# Patient Record
Sex: MALE | Race: ASIAN | NOT HISPANIC OR LATINO
[De-identification: names, ages, dates, MRNs, and addresses within clinical notes are randomized per-mention and may not be internally consistent; named-entity substitution may affect disease eponyms.]

---

## 2017-01-13 ENCOUNTER — APPOINTMENT (OUTPATIENT)
Dept: CARDIOLOGY | Facility: CLINIC | Age: 72
End: 2017-01-13

## 2017-01-26 VITALS
RESPIRATION RATE: 16 BRPM | OXYGEN SATURATION: 99 % | SYSTOLIC BLOOD PRESSURE: 128 MMHG | TEMPERATURE: 98 F | DIASTOLIC BLOOD PRESSURE: 65 MMHG | HEART RATE: 61 BPM | HEIGHT: 66 IN | WEIGHT: 169.98 LBS

## 2017-01-26 RX ORDER — CHLORHEXIDINE GLUCONATE 213 G/1000ML
1 SOLUTION TOPICAL ONCE
Qty: 0 | Refills: 0 | Status: DISCONTINUED | OUTPATIENT
Start: 2017-02-10 | End: 2017-02-10

## 2017-01-26 NOTE — H&P ADULT. - CARDIOVASCULAR SYMPTOMS
paroxysmal nocturnal dyspnea/orthopnea/chest pain/dyspnea on exertion/palpitations dyspnea on exertion/chest pain

## 2017-01-26 NOTE — H&P ADULT. - HISTORY OF PRESENT ILLNESS
*******SKELETON:    ******HISTORY OBTAINED FROM PATIENT VIA PACIFIC Malay SPEAKING INTERPRETOR ID #       71 y.o Hebrew Speaking male with PMHx of HTN, hyperlipidemia, NIDDM, PPM (last interogate.......   ), Known CAD with prior PCIs, most recently IVUS guided KAYKAY proximal LAD @ [facility] on 06/15/16,     who presented to his cardiologist c/o       He denies experiencing any CP, SOB, palpitations, dizziness, syncope, or decrease in exercise tolerance.  Pt subsequently underwent a       In light of pt's risk factors, Known CAD, and above CCS Anginal Class 3 Symptoms, and abnormal Stress test, pt is now referred to [facility] for recommended Cardiac Cath with possible intervention if clinically indicated to r/o suspected CAD prorgre        MOST RECENT CATH HX:    Cardiac Cath @ [facility] 4/2016: normal LM, normal LAD, 40% pLCx, 60% OM2, normal RCA, EF 55%.  Recommended for medical therapy.      Cardiac Cath @ [facility] on 06/15/16: 2VCAD; Normal LM, 70% proximal LAD with OCT MLA = 2.59mm2, patent prior distal LCx stent, 70% OM1, RCA with luminal irregularities, LVEF of 65%.  NO AS or MR.  Successful IVUS guided KAYKAY proximal LAD. *******SKELETON:    ******HISTORY OBTAINED FROM PATIENT VIA PACIFIC Danish SPEAKING INTERPRETOR ID #       71 y.o Croatian Speaking male with PMHx of HTN, hyperlipidemia, NIDDM, PPM (last interogate.......   ), Known CAD with prior PCIs, most recently IVUS guided KAYKAY proximal LAD @ [facility] on 06/15/16 with residual 70% OM1 lesion, who presented to his cardiologist c/o       He denies experiencing any CP, SOB, palpitations, dizziness, syncope, or decrease in exercise tolerance.  Pt subsequently underwent a       In light of pt's risk factors, Known CAD, and above CCS Anginal Class 3 Symptoms, and abnormal Stress test, pt is now referred to [facility] for recommended Cardiac Cath with possible intervention if clinically indicated to r/o suspected CAD prorgre        MOST RECENT CATH HX:    Cardiac Cath @ [facility] 4/2016: normal LM, normal LAD, 40% pLCx, 60% OM2, normal RCA, EF 55%.  Recommended for medical therapy.      Cardiac Cath @ [facility] on 06/15/16: 2VCAD; Normal LM, 70% proximal LAD with OCT MLA = 2.59mm2, patent prior distal LCx stent, 70% OM1, RCA with luminal irregularities, LVEF of 65%.  NO AS or MR.  Successful IVUS guided KAYKAY proximal LAD. *******SKELETON:    ******HISTORY OBTAINED FROM PATIENT VIA PACIFIC Bulgarian SPEAKING INTERPRETOR ID #       ***** Previous Admission, post procedure pt with pt inability to void and bladder scan revealed 800cc urine and garcia was inserted. Garcia was removed overnight and pt passed  voiding trial with no issues.        71 y.o Bulgarian Speaking male with PMHx of HTN, hyperlipidemia, NIDDM, PPM (last interogated.......   ), Known CAD with prior PCIs, most recently IVUS guided KAYKAY proximal LAD @ [facility] on 06/15/16 with residual 70% OM1 lesion, who presented to his cardiologist c/o       He denies experiencing any CP, SOB, palpitations, dizziness, syncope, or decrease in exercise tolerance.  Pt subsequently underwent a       In light of pt's risk factors, Known CAD, and above CCS Anginal Class 3 Symptoms, and abnormal Stress test, pt is now referred to [facility] for recommended Cardiac Cath with possible intervention if clinically indicated to r/o suspected CAD progression.          MOST RECENT CATH HX:    Cardiac Cath @ [facility] 4/2016: normal LM, normal LAD, 40% pLCx, 60% OM2, normal RCA, EF 55%.  Recommended for medical therapy.      Cardiac Cath @ [facility] on 06/15/16: 2VCAD; Normal LM, 70% proximal LAD with OCT MLA = 2.59mm2, patent prior distal LCx stent, 70% OM1, RCA with luminal irregularities, LVEF of 65%.  NO AS or MR.  Successful IVUS guided KAYKAY proximal LAD. ******HISTORY OBTAINED FROM PATIENT VIA [facility] SPEAKING INTERPRETOR ID # [id number]      ***** OF NOTE: Previous Admission, post procedure pt with inability to void.  Bladder scan done revealed 800cc urine and garcia was inserted. Garcia was removed overnight and pt passed  voiding trial with no issues.        71 y.o Setswana Speaking male with PMHx of HTN, hyperlipidemia, NIDDM, ICD (last interrogated   ), Known CAD with prior PCIs, most recently IVUS guided KAYKAY proximal LAD @ [facility] on 06/15/16 with residual 70% OM1 lesion, post procedure with difficulty urinating, garcia catheter was placed, removed, and who presented to his cardiologist c/o left sided CP described as "pressure"  radiating through to the back  with accompanying dyspnea when walking on incline for the past 2-3 months.  Symptoms last a "few' seconds and resolve on their own.  He denies experiencing any CP, SOB, palpitations, dizziness, syncope, or decrease in exercise tolerance.  Pt subsequently underwent a       In light of pt's risk factors, Known CAD, and above CCS Anginal Class 3 Symptoms, and abnormal Stress test, pt is now referred to [facility] for recommended Cardiac Cath with possible intervention if clinically indicated to r/o suspected CAD progression.          MOST RECENT CATH HX:    Cardiac Cath @ [facility] 4/2016: normal LM, normal LAD, 40% pLCx, 60% OM2, normal RCA, EF 55%.  Recommended for medical therapy.      Cardiac Cath @ [facility] on 06/15/16: 2VCAD; Normal LM, 70% proximal LAD with OCT MLA = 2.59mm2, patent prior distal LCx stent, 70% OM1, RCA with luminal irregularities, LVEF of 65%.  NO AS or MR.  Successful IVUS guided KAYKAY proximal LAD. ******HISTORY OBTAINED FROM PATIENT VIA [employer] SPEAKING INTERPRETOR ID # [id number]      ***PT TO BRING IN ALL MEDS     71 y.o Luxembourgish Speaking male with PMHx of HTN, hyperlipidemia, NIDDM, ICD (last interrogated   ), Known CAD with prior PCIs, most recently IVUS guided KAYKAY proximal LAD @ [facility] on 06/15/16 with residual 70% OM1 lesion, post procedure with difficulty urinating requiring garcia, which was removed overnight and pt passed voiding trial with no difficulty, who presented returned to his cardiologist, Dr. Ashby,  c/o left sided CP described as "pressure"  radiating through to the back  with accompanying dyspnea when walking on incline for the past 2-3 months.  Symptoms last a "few' seconds and resolve on their own.  He denies experiencing any CP, SOB, palpitations, dizziness, syncope, or decrease in exercise tolerance.  Since his last procedure with us he has had no problems urinating. Nuclear Stress test done on   revealed       In light of pt's risk factors, Known CAD, above presenting CCS Anginal Class 3 Symptoms, and abnormal Stress test, pt is now referred to [facility] for recommended Cardiac Cath with possible intervention if clinically indicated to r/o suspected CAD progression.          MOST RECENT CATH HX:    Cardiac Cath @ [facility] 4/2016: normal LM, normal LAD, 40% pLCx, 60% OM2, normal RCA, EF 55%.  Recommended for medical therapy.      Cardiac Cath @ [facility] on 06/15/16: 2VCAD; Normal LM, 70% proximal LAD with OCT MLA = 2.59mm2, patent prior distal LCx stent, 70% OM1, RCA with luminal irregularities, LVEF of 65%.  NO AS or MR.  Successful IVUS guided KAYKAY proximal LAD. ******HISTORY OBTAINED FROM PREVIOUS ADMISSION, MD NOTE,  AND PATIENT VIA PACIFIC Armenian SPEAKING INTERPRETOR ID # [id number]      ***PT TO BRING IN ALL MEDS     71 y.o Bulgarian Speaking male with PMHx of HTN, hyperlipidemia, NIDDM, PPM (? when last checked, Per MD note needs to be interrogated),  Known CAD with prior PCIs, most recently IVUS guided KAYKAY proximal LAD @ [facility] on 06/15/16 with residual 70% OM1 lesion, post procedure with difficulty urinating requiring garcia, which was removed overnight and pt passed voiding trial with no difficulty, who presented returned to his cardiologist, Dr. Ashby,  c/o left sided CP described as "pressure"  radiating through to the back  with accompanying dyspnea when walking on incline for the past 2-3 months.  Symptoms last a "few' seconds and resolve on their own.  He denies experiencing any palpitations, n/v, diaphoresis, dizziness, or syncope.  Since his last procedure with us he reports no problems urinating. Nuclear Stress test done on 01/13/17 revealed small, mild to moderate defect in the inferior wall that is fixed, suggestive of diaphragmatic attenuation artifact, LVEF of 70%.      In light of pt's risk factors, Known CAD, above presenting CCS Anginal Class 3 Symptoms, and suboptimal Stress test, pt is now referred to [facility] for recommended Cardiac Cath with possible intervention if clinically indicated to r/o suspected CAD progression.          MOST RECENT CATH HX:    Cardiac Cath @ [facility] 4/2016: normal LM, normal LAD, 40% pLCx, 60% OM2, normal RCA, EF 55%.  Recommended for medical therapy.      Cardiac Cath @ [facility] on 06/15/16: 2VCAD; Normal LM, 70% proximal LAD with OCT MLA = 2.59mm2, patent prior distal LCx stent, 70% OM1, RCA with luminal irregularities, LVEF of 65%.  NO AS or MR.  Successful IVUS guided KAYKAY proximal LAD. ******HISTORY OBTAINED FROM PREVIOUS ADMISSION, MD NOTE,  AND PATIENT VIA PACIFIC French SPEAKING INTERPRETOR ID # [id number]      ***PT TO BRING IN ALL MEDS     71 y.o Kyrgyz Speaking male with PMHx of HTN, hyperlipidemia, NIDDM, PPM (? when last checked, Per MD note needs to be interrogated),  Known CAD with prior PCIs, most recently IVUS guided KAYKAY proximal LAD @ [facility] on 06/15/16 with residual 70% OM1 lesion, post procedure with difficulty urinating requiring garcia, which was removed overnight and pt passed voiding trial with no difficulty, who presented returned to his cardiologist, Dr. Ashby,  c/o left sided CP described as "pressure"  radiating through to the back  with accompanying dyspnea when walking on incline for the past 2-3 months.  Symptoms last a "few' seconds and resolve on their own.  He denies experiencing any palpitations, n/v, diaphoresis, dizziness, or syncope.  Since his last procedure with us he reports no problems urinating. Nuclear Stress test done on 01/13/17 revealed small, mild to moderate defect in the inferior wall that is fixed, suggestive of diaphragmatic attenuation artifact, LVEF of 70%.      In light of pt's risk factors, Known CAD, above presenting CCS Anginal Class 3 Symptoms in the setting of a suboptimal Stress test, pt is now referred to [facility] for recommended Cardiac Cath with possible intervention if clinically indicated to r/o suspected CAD progression.          MOST RECENT CATH HX:    Cardiac Cath @ [facility] 4/2016: normal LM, normal LAD, 40% pLCx, 60% OM2, normal RCA, EF 55%.  Recommended for medical therapy.      Cardiac Cath @ [facility] on 06/15/16: 2VCAD; Normal LM, 70% proximal LAD with OCT MLA = 2.59mm2, patent prior distal LCx stent, 70% OM1, RCA with luminal irregularities, LVEF of 65%.  NO AS or MR.  Successful IVUS guided KAYKAY proximal LAD. ******HISTORY OBTAINED FROM PREVIOUS ADMISSION, MD NOTE,  DR. IBARRA, AND PATIENT VIA PACIFIC Sinhala SPEAKING INTERPRETOR ID # [id number]      ***PT TO BRING IN ALL MEDS     71 y.o Turkish Speaking male, POOR HISTORIAN, with PMHx of HTN, hyperlipidemia, NIDDM, PPM for Symptomatic Bradycardia (Last interrogated ~ 2 months ago @ OSH as per Dr. Ibarra), Known CAD with prior PCIs, most recently IVUS guided KAYKAY proximal LAD @ [facility] on 06/15/16 with residual 70% OM1 lesion, post procedure pt with difficulty urinating requiring garcia, which was removed overnight and pt passed voiding trial with no difficulty.  Since his last procedure with us he reports no problems urinating.  He recently returned to his cardiologist, Dr. Ibarra,  c/o left sided CP described as "pressure"  radiating through to the back  with accompanying dyspnea when walking on incline for the past 2-3 months.  Symptoms last a "few' seconds and resolve on their own.  He denies experiencing any palpitations, n/v, diaphoresis, dizziness, or syncope.  Nuclear Stress test done on 01/13/17 revealed small, mild to moderate defect in the inferior wall that is fixed, suggestive of diaphragmatic attenuation artifact, LVEF of 70%.      In light of pt's risk factors, Known CAD, above presenting CCS Anginal Class 3 Symptoms in the setting of a suboptimal Stress test, pt is now referred to [facility] for recommended Cardiac Cath with possible intervention if clinically indicated to r/o suspected CAD progression.          MOST RECENT CATH HX:    Cardiac Cath @ [facility] 4/2016: normal LM, normal LAD, 40% pLCx, 60% OM2, normal RCA, EF 55%.  Recommended for medical therapy.      Cardiac Cath @ [facility] on 06/15/16: 2VCAD; Normal LM, 70% proximal LAD with OCT MLA = 2.59mm2, patent prior distal LCx stent, 70% OM1, RCA with luminal irregularities, LVEF of 65%.  NO AS or MR.  Successful IVUS guided KAYKAY proximal LAD. ******HISTORY OBTAINED FROM PREVIOUS ADMISSION, MD NOTE,  DR. IBARRA, AND PATIENT VIA PACIFIC Persian SPEAKING INTERPRETOR ID # [id number]      71 y.o Swedish Speaking male, POOR HISTORIAN, with PMHx of HTN, hyperlipidemia, NIDDM, PPM for Symptomatic Bradycardia (Last interrogated ~ 2 months ago @ OSH as per Dr. Ibarra), Known CAD with prior PCIs, most recently IVUS guided KAYKAY proximal LAD @ [facility] on 06/15/16 with residual 70% OM1 lesion, post procedure pt with difficulty urinating requiring garcia, which was removed overnight and pt passed voiding trial with no difficulty.  Since his last procedure with us he reports no problems urinating.  He recently returned to his cardiologist, Dr. Ibarra,  c/o left sided CP described as "pressure"  radiating through to the back  with accompanying dyspnea when walking on incline for the past 2-3 months.  Symptoms last a "few' seconds and resolve on their own.  He denies experiencing any palpitations, n/v, diaphoresis, dizziness, or syncope.  Nuclear Stress test done on 01/13/17 revealed small, mild to moderate defect in the inferior wall that is fixed, suggestive of diaphragmatic attenuation artifact, LVEF of 70%.      In light of pt's risk factors, Known CAD, above presenting CCS Anginal Class 3 Symptoms in the setting of a suboptimal Stress test, pt is now referred to [facility] for recommended Cardiac Cath with possible intervention if clinically indicated to r/o suspected CAD progression.          MOST RECENT CATH HX:    Cardiac Cath @ [facility] 4/2016: normal LM, normal LAD, 40% pLCx, 60% OM2, normal RCA, EF 55%.  Recommended for medical therapy.      Cardiac Cath @ [facility] on 06/15/16: 2VCAD; Normal LM, 70% proximal LAD with OCT MLA = 2.59mm2, patent prior distal LCx stent, 70% OM1, RCA with luminal irregularities, LVEF of 65%.  NO AS or MR.  Successful IVUS guided KAYKAY proximal LAD.

## 2017-01-26 NOTE — H&P ADULT. - PMH
Arrhythmia    DM (diabetes mellitus)    HLD (hyperlipidemia)    HTN (hypertension)    Pacemaker    Syncope

## 2017-01-26 NOTE — H&P ADULT. - ASSESSMENT
71 y.o Chinese Speaking male, POOR HISTORIAN, with PMHx of HTN, hyperlipidemia, NIDDM, PPM for Symptomatic Bradycardia (Last interrogated ~ 2 months ago @ OSH as per Dr. Ashby), Known CAD with prior PCIs, most recently IVUS guided KAYKAY proximal LAD @ [facility] on 06/15/16 with residual 70% OM1 lesion, who presents for recommended Cardiac cath with possible intervention if clinically indicated secondary to CCS Anginal Class 3 Symptoms in the setting of an abnormal stress test.      OF NOTE: pt with WBC 17 and Plt 407   As d/w Dr. Miller,   Pt 71 y.o Sinhala Speaking male, POOR HISTORIAN, with PMHx of HTN, hyperlipidemia, NIDDM, PPM for Symptomatic Bradycardia (Last interrogated ~ 2 months ago @ OSH as per Dr. Ashby), Known CAD with prior PCIs, most recently IVUS guided KAYKAY proximal LAD @ [facility] on 06/15/16 with residual 70% OM1 lesion, who presents for recommended Cardiac cath with possible intervention if clinically indicated secondary to CCS Anginal Class 3 Symptoms in the setting of an abnormal stress test.      OF NOTE:  Pt with  Leukocytosis of 15.7, afebrile.   No recent infections or steroid use. 71 y.o Wolof Speaking male, POOR HISTORIAN, with PMHx of HTN, hyperlipidemia, NIDDM, PPM for Symptomatic Bradycardia (Last interrogated ~ 2 months ago @ OSH as per Dr. Ashby), Known CAD with prior PCIs, most recently IVUS guided KAYKAY proximal LAD @ [facility] on 06/15/16 with residual 70% OM1 lesion, who presents for recommended Cardiac cath with possible intervention if clinically indicated secondary to CCS Anginal Class 3 Symptoms in the setting of an abnormal stress test.      OF NOTE:  Pt with  Leukocytosis of 15.7, afebrile.   No recent infections or steroid use.  As d/w Dr. Miller will proceed with procedure.

## 2017-02-01 NOTE — H&P ADULT. - SKIN
February 9, 2017    Lázaro Wang  [address] [place] [address]             [facility]  [address]  [place] [facility] [address]  Phone: [phone] Dear Mr. Wang:    We have tried to reach you by mychart unsuccessfully.        [facility] is committed to your overall health.  To help you get the most out of each of your visits, we will review your information to make sure you are up to date on all of your recommended tests and/or procedures.       Dr. Trotter has found that you may be due for annual dilated eye exam, diabetic foot exam.     If you have had any of the above done at another facility, please bring the records or information with you so that your record at [facility] will be complete.     If you are currently taking medication, please bring it with you to your appointment for review.     Also, if you have any type of Advanced Directives, please bring them with you to your office visit so we may scan them into your chart.     Juanjo Hemphill LPN Clinical Care Coordinator   [facility] [address] [facility] [address]   [place] [address]   [phone] (p)   [phone] (f)      No lesions; no rash

## 2017-02-10 ENCOUNTER — OUTPATIENT (OUTPATIENT)
Dept: OUTPATIENT SERVICES | Facility: HOSPITAL | Age: 72
LOS: 1 days | Discharge: NURSING, REHAB OR LONG-TERM CARE | End: 2017-02-10
Payer: MEDICARE

## 2017-02-10 DIAGNOSIS — Z95.0 PRESENCE OF CARDIAC PACEMAKER: Chronic | ICD-10-CM

## 2017-02-10 LAB
ALBUMIN SERPL ELPH-MCNC: 4.2 G/DL — SIGNIFICANT CHANGE UP (ref 3.4–5)
ALP SERPL-CCNC: 66 U/L — SIGNIFICANT CHANGE UP (ref 40–120)
ALT FLD-CCNC: 54 U/L — HIGH (ref 12–42)
ANION GAP SERPL CALC-SCNC: 8 MMOL/L — LOW (ref 9–16)
APTT BLD: 32 SEC — SIGNIFICANT CHANGE UP (ref 27.5–37.4)
AST SERPL-CCNC: 27 U/L — SIGNIFICANT CHANGE UP (ref 15–37)
BASOPHILS NFR BLD AUTO: 2 % — SIGNIFICANT CHANGE UP (ref 0–2)
BILIRUB SERPL-MCNC: 0.4 MG/DL — SIGNIFICANT CHANGE UP (ref 0.2–1.2)
BUN SERPL-MCNC: 16 MG/DL — SIGNIFICANT CHANGE UP (ref 7–23)
CALCIUM SERPL-MCNC: 8.6 MG/DL — SIGNIFICANT CHANGE UP (ref 8.5–10.5)
CHLORIDE SERPL-SCNC: 104 MMOL/L — SIGNIFICANT CHANGE UP (ref 96–108)
CHOLEST SERPL-MCNC: 102 MG/DL — SIGNIFICANT CHANGE UP
CK MB CFR SERPL CALC: 1.4 NG/ML — SIGNIFICANT CHANGE UP (ref 0.5–3.6)
CO2 SERPL-SCNC: 28 MMOL/L — SIGNIFICANT CHANGE UP (ref 22–31)
CREAT SERPL-MCNC: 1.13 MG/DL — SIGNIFICANT CHANGE UP (ref 0.5–1.3)
CRP SERPL-MCNC: <0.29 MG/DL — SIGNIFICANT CHANGE UP
GLUCOSE SERPL-MCNC: 178 MG/DL — HIGH (ref 70–99)
HBA1C BLD-MCNC: 7.3 % — HIGH (ref 4.8–5.6)
HCT VFR BLD CALC: 43.1 % — SIGNIFICANT CHANGE UP (ref 39–50)
HDLC SERPL-MCNC: 43 MG/DL — SIGNIFICANT CHANGE UP
HGB BLD-MCNC: 14.5 G/DL — SIGNIFICANT CHANGE UP (ref 13–17)
INR BLD: 0.92 — SIGNIFICANT CHANGE UP (ref 0.88–1.16)
LIPID PNL WITH DIRECT LDL SERPL: 23 MG/DL — SIGNIFICANT CHANGE UP
LYMPHOCYTES # BLD AUTO: 20 % — SIGNIFICANT CHANGE UP (ref 13–44)
MCHC RBC-ENTMCNC: 28.5 PG — SIGNIFICANT CHANGE UP (ref 27–34)
MCHC RBC-ENTMCNC: 33.6 G/DL — SIGNIFICANT CHANGE UP (ref 32–36)
MCV RBC AUTO: 84.7 FL — SIGNIFICANT CHANGE UP (ref 80–100)
MONOCYTES NFR BLD AUTO: 5 % — SIGNIFICANT CHANGE UP (ref 2–14)
NEUTROPHILS NFR BLD AUTO: 68 % — SIGNIFICANT CHANGE UP (ref 43–77)
PLATELET # BLD AUTO: 407 K/UL — HIGH (ref 150–400)
POTASSIUM SERPL-MCNC: 3.8 MMOL/L — SIGNIFICANT CHANGE UP (ref 3.5–5.3)
POTASSIUM SERPL-SCNC: 3.8 MMOL/L — SIGNIFICANT CHANGE UP (ref 3.5–5.3)
PROT SERPL-MCNC: 8 G/DL — SIGNIFICANT CHANGE UP (ref 6.4–8.2)
PROTHROM AB SERPL-ACNC: 10.2 SEC — SIGNIFICANT CHANGE UP (ref 10–13.1)
RBC # BLD: 5.09 M/UL — SIGNIFICANT CHANGE UP (ref 4.2–5.8)
RBC # FLD: 14.4 % — SIGNIFICANT CHANGE UP (ref 10.3–16.9)
SODIUM SERPL-SCNC: 140 MMOL/L — SIGNIFICANT CHANGE UP (ref 135–145)
TOTAL CHOLESTEROL/HDL RATIO MEASUREMENT: 2.4 RATIO — SIGNIFICANT CHANGE UP
TRIGL SERPL-MCNC: 182 MG/DL — HIGH
WBC # BLD: 17 K/UL — HIGH (ref 3.8–10.5)
WBC # FLD AUTO: 17 K/UL — HIGH (ref 3.8–10.5)

## 2017-02-10 PROCEDURE — 85025 COMPLETE CBC W/AUTO DIFF WBC: CPT

## 2017-02-10 PROCEDURE — 86140 C-REACTIVE PROTEIN: CPT

## 2017-02-10 PROCEDURE — 36415 COLL VENOUS BLD VENIPUNCTURE: CPT

## 2017-02-10 PROCEDURE — 85730 THROMBOPLASTIN TIME PARTIAL: CPT

## 2017-02-10 PROCEDURE — 80061 LIPID PANEL: CPT

## 2017-02-10 PROCEDURE — 80053 COMPREHEN METABOLIC PANEL: CPT

## 2017-02-10 PROCEDURE — 93458 L HRT ARTERY/VENTRICLE ANGIO: CPT | Mod: 26

## 2017-02-10 PROCEDURE — 85610 PROTHROMBIN TIME: CPT

## 2017-02-10 PROCEDURE — 82550 ASSAY OF CK (CPK): CPT

## 2017-02-10 PROCEDURE — C1769: CPT

## 2017-02-10 PROCEDURE — C1887: CPT

## 2017-02-10 PROCEDURE — 93458 L HRT ARTERY/VENTRICLE ANGIO: CPT

## 2017-02-10 PROCEDURE — 82553 CREATINE MB FRACTION: CPT

## 2017-02-10 PROCEDURE — 83036 HEMOGLOBIN GLYCOSYLATED A1C: CPT

## 2017-02-10 RX ORDER — SODIUM CHLORIDE 9 MG/ML
1000 INJECTION INTRAMUSCULAR; INTRAVENOUS; SUBCUTANEOUS
Qty: 0 | Refills: 0 | Status: DISCONTINUED | OUTPATIENT
Start: 2017-02-10 | End: 2017-02-10

## 2017-02-10 RX ORDER — ASPIRIN/CALCIUM CARB/MAGNESIUM 324 MG
81 TABLET ORAL ONCE
Qty: 0 | Refills: 0 | Status: COMPLETED | OUTPATIENT
Start: 2017-02-10 | End: 2017-02-10

## 2017-02-10 RX ORDER — SODIUM CHLORIDE 9 MG/ML
500 INJECTION INTRAMUSCULAR; INTRAVENOUS; SUBCUTANEOUS
Qty: 0 | Refills: 0 | Status: DISCONTINUED | OUTPATIENT
Start: 2017-02-10 | End: 2017-02-10

## 2017-02-10 RX ADMIN — SODIUM CHLORIDE 75 MILLILITER(S): 9 INJECTION INTRAMUSCULAR; INTRAVENOUS; SUBCUTANEOUS at 13:52

## 2017-02-10 RX ADMIN — Medication 81 MILLIGRAM(S): at 13:51

## 2017-02-10 NOTE — PROGRESS NOTE ADULT - SUBJECTIVE AND OBJECTIVE BOX
Interventional Cardiology PA SDA Discharge Note    Patient without complaints. Ambulated and voided without difficulties    Afebrile, VSS    Ext:    		Right/Left             Groin:       hematoma,     bruit, dressing; C/D/I  		Right/Left        R      Radial :  no  hematoma,  no   bleeding, dressing; C/D/I      Pulses:    intact RAD/DP/PT?to baseline     A/P:  HPI:  ******HISTORY OBTAINED FROM PREVIOUS ADMISSION, MD NOTE,  DR. IBARRA, AND PATIENT VIA PACIFIC [employer] SPEAKING INTERPRETOR ID # [id number]    71 y.o Czech Speaking male, POOR HISTORIAN, with PMHx of HTN, hyperlipidemia, NIDDM, PPM for Symptomatic Bradycardia (Last interrogated ~ 2 months ago @ OSH as per Dr. Ibarra), Known CAD with prior PCIs, most recently IVUS guided KAYKAY proximal LAD @ [facility] on 06/15/16 with residual 70% OM1 lesion, post procedure pt with difficulty urinating requiring garcia, which was removed overnight and pt passed voiding trial with no difficulty.  Since his last procedure with us he reports no problems urinating.  He recently returned to his cardiologist, Dr. Ibarra,  c/o left sided CP described as "pressure"  radiating through to the back  with accompanying dyspnea when walking on incline for the past 2-3 months.  Symptoms last a "few' seconds and resolve on their own.  He denies experiencing any palpitations, n/v, diaphoresis, dizziness, or syncope.  Nuclear Stress test done on 01/13/17 revealed small, mild to moderate defect in the inferior wall that is fixed, suggestive of diaphragmatic attenuation artifact, LVEF of 70%.      In light of pt's risk factors, Known CAD, above presenting CCS Anginal Class 3 Symptoms in the setting of a suboptimal Stress test, pt is now referred to [facility] for recommended Cardiac Cath with possible intervention if clinically indicated to r/o suspected CAD progression.    s/p cath 2/10 distal LCx stent widely patent, 50% prox OM2,, pLAD stent patent, LVEF 55% EDP 19, nonob CAD, continue medical management        MOST RECENT CATH HX:    Cardiac Cath @ [facility] 4/2016: normal LM, normal LAD, 40% pLCx, 60% OM2, normal RCA, EF 55%.  Recommended for medical therapy.      Cardiac Cath @ [facility] on 06/15/16: 2VCAD; Normal LM, 70% proximal LAD with OCT MLA = 2.59mm2, patent prior distal LCx stent, 70% OM1, RCA with luminal irregularities, LVEF of 65%.  NO AS or MR.  Successful IVUS guided KAYKAY proximal LAD. (26 Jan 2017 07:53)          1.	Stable for discharge as per attending  after bed rest, pt voids, groin/wrist stable and 30 minutes of ambulation.  2.	Follow-up with PMD/Cardiologist Isma in 1-2 weeks  3.	Discharged forms signed and copies in chart

## 2017-02-17 DIAGNOSIS — E78.5 HYPERLIPIDEMIA, UNSPECIFIED: ICD-10-CM

## 2017-02-17 DIAGNOSIS — I20.8 OTHER FORMS OF ANGINA PECTORIS: ICD-10-CM

## 2017-02-17 DIAGNOSIS — E11.9 TYPE 2 DIABETES MELLITUS WITHOUT COMPLICATIONS: ICD-10-CM

## 2017-02-17 DIAGNOSIS — Z82.49 FAMILY HISTORY OF ISCHEMIC HEART DISEASE AND OTHER DISEASES OF THE CIRCULATORY SYSTEM: ICD-10-CM

## 2017-02-17 DIAGNOSIS — I10 ESSENTIAL (PRIMARY) HYPERTENSION: ICD-10-CM

## 2017-04-21 ENCOUNTER — EMERGENCY (EMERGENCY)
Facility: HOSPITAL | Age: 72
LOS: 1 days | Discharge: HOME | End: 2017-04-21
Attending: EMERGENCY MEDICINE | Admitting: EMERGENCY MEDICINE
Payer: MEDICARE

## 2017-04-21 VITALS
TEMPERATURE: 99 F | OXYGEN SATURATION: 96 % | HEART RATE: 63 BPM | SYSTOLIC BLOOD PRESSURE: 139 MMHG | RESPIRATION RATE: 18 BRPM | DIASTOLIC BLOOD PRESSURE: 75 MMHG

## 2017-04-21 DIAGNOSIS — Z95.0 PRESENCE OF CARDIAC PACEMAKER: Chronic | ICD-10-CM

## 2017-04-21 LAB
BASOPHILS # BLD AUTO: 0.4 K/UL — HIGH (ref 0–0.2)
BASOPHILS NFR BLD AUTO: 2 % — SIGNIFICANT CHANGE UP (ref 0–2)
EOSINOPHIL # BLD AUTO: 0.2 K/UL — SIGNIFICANT CHANGE UP (ref 0–0.5)
EOSINOPHIL NFR BLD AUTO: 1 % — SIGNIFICANT CHANGE UP (ref 0–6)
HCT VFR BLD CALC: 39.2 % — SIGNIFICANT CHANGE UP (ref 39–50)
HGB BLD-MCNC: 12.8 G/DL — LOW (ref 13–17)
LYMPHOCYTES # BLD AUTO: 2 K/UL — SIGNIFICANT CHANGE UP (ref 1–3.3)
LYMPHOCYTES # BLD AUTO: 8 % — LOW (ref 13–44)
MCHC RBC-ENTMCNC: 28.4 PG — SIGNIFICANT CHANGE UP (ref 27–34)
MCHC RBC-ENTMCNC: 32.8 GM/DL — SIGNIFICANT CHANGE UP (ref 32–36)
MCV RBC AUTO: 86.6 FL — SIGNIFICANT CHANGE UP (ref 80–100)
METAMYELOCYTES # FLD: 3 % — HIGH (ref 0–0)
MONOCYTES # BLD AUTO: 0.9 K/UL — SIGNIFICANT CHANGE UP (ref 0–0.9)
MONOCYTES NFR BLD AUTO: 7 % — SIGNIFICANT CHANGE UP (ref 2–14)
MYELOCYTES NFR BLD: 2 % — HIGH (ref 0–0)
NEUTROPHILS # BLD AUTO: SIGNIFICANT CHANGE UP (ref 1.8–7.4)
NEUTROPHILS NFR BLD AUTO: 77 % — SIGNIFICANT CHANGE UP (ref 43–77)
PLAT MORPH BLD: NORMAL — SIGNIFICANT CHANGE UP
PLATELET # BLD AUTO: 440 K/UL — HIGH (ref 150–400)
RBC # BLD: 4.52 M/UL — SIGNIFICANT CHANGE UP (ref 4.2–5.8)
RBC # FLD: 13.2 % — SIGNIFICANT CHANGE UP (ref 10.3–14.5)
RBC BLD AUTO: SIGNIFICANT CHANGE UP
WBC # BLD: 23.6 K/UL — HIGH (ref 3.8–10.5)
WBC # FLD AUTO: 23.6 K/UL — HIGH (ref 3.8–10.5)

## 2017-04-21 PROCEDURE — 99284 EMERGENCY DEPT VISIT MOD MDM: CPT

## 2017-04-21 PROCEDURE — 99283 EMERGENCY DEPT VISIT LOW MDM: CPT

## 2017-04-21 PROCEDURE — 85027 COMPLETE CBC AUTOMATED: CPT

## 2017-04-21 NOTE — ED PROVIDER NOTE - ATTENDING CONTRIBUTION TO CARE
Agree with PA history  Denies symptomatic anemia  No resp distress or feels like something is dripping down the OP, no change in voice or bleeding from anywhere else  On exam, AVSS, right nostril packed with some serosanguinous tinge, no active bleeding, no blood in the OP, no stridor, normal voice

## 2017-04-21 NOTE — ED PROVIDER NOTE - OBJECTIVE STATEMENT
72 M w DM, HLD, HTN, Pacemaker, Syncope, CAD, Arrhythmia and recurrent epistaxis presents w complaint o epistaxis from right nostril which began yesterday morning. He went to an Urgent care facility and the nose was packed with gauze. He was instructed to stop taking Brilinta yesterday. The family accompanied him to ED today and they are concerned that the gauze packing has turned light red since it was placed yesterday. He denies bleeding through the packing or swallowing frequently. He was informed by PMD within the last month that he may have leukemia. He has had prior epistaxis from the right nare, has never had an appointment with ENT. He was prescribed cefadroxil and referred to ENT yesterday but has not picked up the prescription or called for the appointment yet.

## 2017-04-21 NOTE — ED PROVIDER NOTE - PLAN OF CARE
Follow up with your Primary Care Physician within the next 2-3 days  Take Cefodroxil as directed  Return to the Emergency Room tomorrow for packing removal  Follow up with ENT as directed  Bring a copy of your test results with you to your appointment  Continue your current medication regimen  Return to the Emergency Room if you experience new or worsening symptoms

## 2017-04-21 NOTE — ED PROVIDER NOTE - CARE PLAN
Instructions for follow-up, activity and diet:	Follow up with your Primary Care Physician within the next 2-3 days  Take Cefodroxil as directed  Return to the Emergency Room tomorrow for packing removal  Follow up with ENT as directed  Bring a copy of your test results with you to your appointment  Continue your current medication regimen  Return to the Emergency Room if you experience new or worsening symptoms Principal Discharge DX:	Epistaxis  Instructions for follow-up, activity and diet:	Follow up with your Primary Care Physician within the next 2-3 days  Take Cefodroxil as directed  Return to the Emergency Room tomorrow for packing removal  Follow up with ENT as directed  Bring a copy of your test results with you to your appointment  Continue your current medication regimen  Return to the Emergency Room if you experience new or worsening symptoms

## 2017-04-21 NOTE — ED ADULT NURSE NOTE - OBJECTIVE STATEMENT
71 y/o male presents to the ED a&ox3 with c/o nosebleed on right nostril since yesterday. Pt went to urgent care yesterday but it continues to bleed as per pt and family. Pt is on ASA and brillinta. Respirations even and nonlabored. Lungs cta b/l. Denies any cp/sob. Abdomen soft nt nd +BSx4. +pulses +cap refill Will continue to monitor. Minimal dizziness. No HA/lightheadedness. +pulses +cap refill.

## 2017-04-22 ENCOUNTER — EMERGENCY (EMERGENCY)
Facility: HOSPITAL | Age: 72
LOS: 1 days | Discharge: HOME | End: 2017-04-22
Attending: EMERGENCY MEDICINE | Admitting: EMERGENCY MEDICINE
Payer: MEDICARE

## 2017-04-22 VITALS
DIASTOLIC BLOOD PRESSURE: 70 MMHG | TEMPERATURE: 98 F | HEART RATE: 61 BPM | SYSTOLIC BLOOD PRESSURE: 114 MMHG | OXYGEN SATURATION: 98 % | RESPIRATION RATE: 18 BRPM

## 2017-04-22 DIAGNOSIS — Z95.0 PRESENCE OF CARDIAC PACEMAKER: Chronic | ICD-10-CM

## 2017-04-22 DIAGNOSIS — R04.0 EPISTAXIS: ICD-10-CM

## 2017-04-22 PROCEDURE — G0463: CPT

## 2017-04-22 NOTE — ED ADULT NURSE NOTE - OBJECTIVE STATEMENT
Pt presents to the ER A+Ox3 sent in for packing removal of right nostril; placed x2 days ago at urgent care. (-) active bleeding at site. Currently on antibiotics; d/c his Brilinta.

## 2017-04-22 NOTE — ED PROVIDER NOTE - OBJECTIVE STATEMENT
72 y.o. male sent in for packing removal from his right nostril that was placed 2 days ago by urgent care.  Seen yesterday here and was told to come back today for packing removal.  Pt currently on Abx, and D/C'd his Brilinta.  No complaints, no bleeding, no fevers no chills.

## 2017-04-22 NOTE — ED PROVIDER NOTE - MEDICAL DECISION MAKING DETAILS
72 y.o. male sent in for nasal packing removal that was placed 2 days ago, currently on Cefadroxil.  Pt is without complaints.  Nasal packing removed, neuro vasc intact.  Will advised to hold Brilinta and follow up with ENT. ZR

## 2017-05-25 ENCOUNTER — RX RENEWAL (OUTPATIENT)
Age: 72
End: 2017-05-25

## 2017-06-16 ENCOUNTER — RX RENEWAL (OUTPATIENT)
Age: 72
End: 2017-06-16

## 2017-08-14 ENCOUNTER — RX RENEWAL (OUTPATIENT)
Age: 72
End: 2017-08-14

## 2017-08-23 ENCOUNTER — EMERGENCY (EMERGENCY)
Facility: HOSPITAL | Age: 72
LOS: 1 days | Discharge: HOME | End: 2017-08-23
Attending: EMERGENCY MEDICINE | Admitting: EMERGENCY MEDICINE
Payer: MEDICARE

## 2017-08-23 VITALS
DIASTOLIC BLOOD PRESSURE: 72 MMHG | HEART RATE: 61 BPM | TEMPERATURE: 98 F | OXYGEN SATURATION: 97 % | SYSTOLIC BLOOD PRESSURE: 132 MMHG | RESPIRATION RATE: 16 BRPM

## 2017-08-23 VITALS
TEMPERATURE: 97 F | SYSTOLIC BLOOD PRESSURE: 153 MMHG | DIASTOLIC BLOOD PRESSURE: 76 MMHG | RESPIRATION RATE: 16 BRPM | OXYGEN SATURATION: 97 % | HEART RATE: 64 BPM

## 2017-08-23 DIAGNOSIS — Z95.0 PRESENCE OF CARDIAC PACEMAKER: Chronic | ICD-10-CM

## 2017-08-23 LAB
ALBUMIN SERPL ELPH-MCNC: 4.5 G/DL — SIGNIFICANT CHANGE UP (ref 3.3–5)
ALP SERPL-CCNC: 46 U/L — SIGNIFICANT CHANGE UP (ref 40–120)
ALT FLD-CCNC: 18 U/L RC — SIGNIFICANT CHANGE UP (ref 10–45)
ANION GAP SERPL CALC-SCNC: 16 MMOL/L — SIGNIFICANT CHANGE UP (ref 5–17)
APPEARANCE UR: CLEAR — SIGNIFICANT CHANGE UP
AST SERPL-CCNC: 14 U/L — SIGNIFICANT CHANGE UP (ref 10–40)
BASE EXCESS BLDV CALC-SCNC: -1.4 MMOL/L — SIGNIFICANT CHANGE UP (ref -2–2)
BASOPHILS # BLD AUTO: 0 K/UL — SIGNIFICANT CHANGE UP (ref 0–0.2)
BASOPHILS NFR BLD AUTO: 0.5 % — SIGNIFICANT CHANGE UP (ref 0–2)
BILIRUB SERPL-MCNC: 0.6 MG/DL — SIGNIFICANT CHANGE UP (ref 0.2–1.2)
BILIRUB UR-MCNC: NEGATIVE — SIGNIFICANT CHANGE UP
BUN SERPL-MCNC: 24 MG/DL — HIGH (ref 7–23)
CA-I SERPL-SCNC: 1.17 MMOL/L — SIGNIFICANT CHANGE UP (ref 1.12–1.3)
CALCIUM SERPL-MCNC: 9.2 MG/DL — SIGNIFICANT CHANGE UP (ref 8.4–10.5)
CHLORIDE BLDV-SCNC: 107 MMOL/L — SIGNIFICANT CHANGE UP (ref 96–108)
CHLORIDE SERPL-SCNC: 102 MMOL/L — SIGNIFICANT CHANGE UP (ref 96–108)
CO2 BLDV-SCNC: 24 MMOL/L — SIGNIFICANT CHANGE UP (ref 22–30)
CO2 SERPL-SCNC: 22 MMOL/L — SIGNIFICANT CHANGE UP (ref 22–31)
COLOR SPEC: YELLOW — SIGNIFICANT CHANGE UP
CREAT SERPL-MCNC: 1.19 MG/DL — SIGNIFICANT CHANGE UP (ref 0.5–1.3)
DIFF PNL FLD: NEGATIVE — SIGNIFICANT CHANGE UP
EOSINOPHIL # BLD AUTO: 0.4 K/UL — SIGNIFICANT CHANGE UP (ref 0–0.5)
EOSINOPHIL NFR BLD AUTO: 3.9 % — SIGNIFICANT CHANGE UP (ref 0–6)
GAS PNL BLDV: 138 MMOL/L — SIGNIFICANT CHANGE UP (ref 136–145)
GAS PNL BLDV: SIGNIFICANT CHANGE UP
GAS PNL BLDV: SIGNIFICANT CHANGE UP
GLUCOSE BLDV-MCNC: 197 MG/DL — HIGH (ref 70–99)
GLUCOSE SERPL-MCNC: 207 MG/DL — HIGH (ref 70–99)
GLUCOSE UR QL: 500
HCO3 BLDV-SCNC: 23 MMOL/L — SIGNIFICANT CHANGE UP (ref 21–29)
HCT VFR BLD CALC: 38.4 % — LOW (ref 39–50)
HCT VFR BLDA CALC: 39 % — SIGNIFICANT CHANGE UP (ref 39–50)
HGB BLD CALC-MCNC: 12.6 G/DL — LOW (ref 13–17)
HGB BLD-MCNC: 12 G/DL — LOW (ref 13–17)
KETONES UR-MCNC: NEGATIVE — SIGNIFICANT CHANGE UP
LACTATE BLDV-MCNC: 3.3 MMOL/L — HIGH (ref 0.7–2)
LEUKOCYTE ESTERASE UR-ACNC: NEGATIVE — SIGNIFICANT CHANGE UP
LYMPHOCYTES # BLD AUTO: 1.5 K/UL — SIGNIFICANT CHANGE UP (ref 1–3.3)
LYMPHOCYTES # BLD AUTO: 15.6 % — SIGNIFICANT CHANGE UP (ref 13–44)
MCHC RBC-ENTMCNC: 28 PG — SIGNIFICANT CHANGE UP (ref 27–34)
MCHC RBC-ENTMCNC: 31.3 GM/DL — LOW (ref 32–36)
MCV RBC AUTO: 89.6 FL — SIGNIFICANT CHANGE UP (ref 80–100)
MONOCYTES # BLD AUTO: 0.9 K/UL — SIGNIFICANT CHANGE UP (ref 0–0.9)
MONOCYTES NFR BLD AUTO: 10 % — SIGNIFICANT CHANGE UP (ref 2–14)
NEUTROPHILS # BLD AUTO: 6.6 K/UL — SIGNIFICANT CHANGE UP (ref 1.8–7.4)
NEUTROPHILS NFR BLD AUTO: 70 % — SIGNIFICANT CHANGE UP (ref 43–77)
NITRITE UR-MCNC: NEGATIVE — SIGNIFICANT CHANGE UP
PCO2 BLDV: 38 MMHG — SIGNIFICANT CHANGE UP (ref 35–50)
PH BLDV: 7.39 — SIGNIFICANT CHANGE UP (ref 7.35–7.45)
PH UR: 5.5 — SIGNIFICANT CHANGE UP (ref 5–8)
PLATELET # BLD AUTO: 220 K/UL — SIGNIFICANT CHANGE UP (ref 150–400)
PO2 BLDV: 48 MMHG — HIGH (ref 25–45)
POTASSIUM BLDV-SCNC: 3.4 MMOL/L — LOW (ref 3.5–5)
POTASSIUM SERPL-MCNC: 3.6 MMOL/L — SIGNIFICANT CHANGE UP (ref 3.5–5.3)
POTASSIUM SERPL-SCNC: 3.6 MMOL/L — SIGNIFICANT CHANGE UP (ref 3.5–5.3)
PROT SERPL-MCNC: 7.4 G/DL — SIGNIFICANT CHANGE UP (ref 6–8.3)
PROT UR-MCNC: NEGATIVE — SIGNIFICANT CHANGE UP
RBC # BLD: 4.29 M/UL — SIGNIFICANT CHANGE UP (ref 4.2–5.8)
RBC # FLD: 14.6 % — HIGH (ref 10.3–14.5)
RBC CASTS # UR COMP ASSIST: SIGNIFICANT CHANGE UP /HPF (ref 0–2)
SAO2 % BLDV: 83 % — SIGNIFICANT CHANGE UP (ref 67–88)
SODIUM SERPL-SCNC: 140 MMOL/L — SIGNIFICANT CHANGE UP (ref 135–145)
SP GR SPEC: 1.01 — SIGNIFICANT CHANGE UP (ref 1.01–1.02)
UROBILINOGEN FLD QL: NEGATIVE — SIGNIFICANT CHANGE UP
WBC # BLD: 9.5 K/UL — SIGNIFICANT CHANGE UP (ref 3.8–10.5)
WBC # FLD AUTO: 9.5 K/UL — SIGNIFICANT CHANGE UP (ref 3.8–10.5)
WBC UR QL: SIGNIFICANT CHANGE UP /HPF (ref 0–5)

## 2017-08-23 PROCEDURE — 84132 ASSAY OF SERUM POTASSIUM: CPT

## 2017-08-23 PROCEDURE — 87086 URINE CULTURE/COLONY COUNT: CPT

## 2017-08-23 PROCEDURE — 82435 ASSAY OF BLOOD CHLORIDE: CPT

## 2017-08-23 PROCEDURE — 82803 BLOOD GASES ANY COMBINATION: CPT

## 2017-08-23 PROCEDURE — 74176 CT ABD & PELVIS W/O CONTRAST: CPT

## 2017-08-23 PROCEDURE — 84295 ASSAY OF SERUM SODIUM: CPT

## 2017-08-23 PROCEDURE — 83605 ASSAY OF LACTIC ACID: CPT

## 2017-08-23 PROCEDURE — 82947 ASSAY GLUCOSE BLOOD QUANT: CPT

## 2017-08-23 PROCEDURE — 99285 EMERGENCY DEPT VISIT HI MDM: CPT

## 2017-08-23 PROCEDURE — 96375 TX/PRO/DX INJ NEW DRUG ADDON: CPT

## 2017-08-23 PROCEDURE — 85027 COMPLETE CBC AUTOMATED: CPT

## 2017-08-23 PROCEDURE — 80053 COMPREHEN METABOLIC PANEL: CPT

## 2017-08-23 PROCEDURE — 96374 THER/PROPH/DIAG INJ IV PUSH: CPT

## 2017-08-23 PROCEDURE — 82330 ASSAY OF CALCIUM: CPT

## 2017-08-23 PROCEDURE — 81001 URINALYSIS AUTO W/SCOPE: CPT

## 2017-08-23 PROCEDURE — 85014 HEMATOCRIT: CPT

## 2017-08-23 PROCEDURE — 74176 CT ABD & PELVIS W/O CONTRAST: CPT | Mod: 26

## 2017-08-23 PROCEDURE — 99284 EMERGENCY DEPT VISIT MOD MDM: CPT | Mod: 25

## 2017-08-23 RX ORDER — ACETAMINOPHEN 500 MG
1000 TABLET ORAL ONCE
Qty: 0 | Refills: 0 | Status: COMPLETED | OUTPATIENT
Start: 2017-08-23 | End: 2017-08-23

## 2017-08-23 RX ORDER — KETOROLAC TROMETHAMINE 30 MG/ML
15 SYRINGE (ML) INJECTION ONCE
Qty: 0 | Refills: 0 | Status: DISCONTINUED | OUTPATIENT
Start: 2017-08-23 | End: 2017-08-23

## 2017-08-23 RX ORDER — METHOCARBAMOL 500 MG/1
1 TABLET, FILM COATED ORAL
Qty: 9 | Refills: 0
Start: 2017-08-23 | End: 2017-08-26

## 2017-08-23 RX ORDER — LIDOCAINE 4 G/100G
1 CREAM TOPICAL ONCE
Qty: 0 | Refills: 0 | Status: COMPLETED | OUTPATIENT
Start: 2017-08-23 | End: 2017-08-23

## 2017-08-23 RX ORDER — SODIUM CHLORIDE 9 MG/ML
1000 INJECTION INTRAMUSCULAR; INTRAVENOUS; SUBCUTANEOUS ONCE
Qty: 0 | Refills: 0 | Status: COMPLETED | OUTPATIENT
Start: 2017-08-23 | End: 2017-08-23

## 2017-08-23 RX ADMIN — SODIUM CHLORIDE 1000 MILLILITER(S): 9 INJECTION INTRAMUSCULAR; INTRAVENOUS; SUBCUTANEOUS at 11:10

## 2017-08-23 RX ADMIN — Medication 400 MILLIGRAM(S): at 11:10

## 2017-08-23 RX ADMIN — LIDOCAINE 1 PATCH: 4 CREAM TOPICAL at 11:10

## 2017-08-23 RX ADMIN — Medication 15 MILLIGRAM(S): at 11:10

## 2017-08-23 NOTE — ED ADULT NURSE NOTE - OBJECTIVE STATEMENT
71 y/o M pt w/ pmh of DM, HTN, HLD, pacemaker, present to ED with left lower back pain times 1 week, worsen last night, pt now having difficulty walking, pain better at rest and worsen with ambulation, on exam 9/10 lower, - CVA tenderness, denies hematuria, dysuria, fevers, chills, N/V/D, chest pain, SOB, numbness, tingling, loss of bowel or bladder function

## 2017-08-23 NOTE — ED PROVIDER NOTE - MEDICAL DECISION MAKING DETAILS
Attending Johnston: 73y/o male presenting with lower back pain. no pulsatile mass on exam and strong distal pulses making aaa unlikely. no focal neurologic deficits or risk factors for epidural abscess/hematoma or spinal injury. Ct performed to evaluate for fracuture which was negative but did show lytic lesions. pt advised to have bone scan and importance of follow up.. pt able to ambulate in the ED and requested d/c. plan to d/c

## 2017-08-23 NOTE — ED PROVIDER NOTE - PHYSICAL EXAMINATION
Attending Johnston: Gen: NAD, heent: atrauamtic, eomi, perrla, mmm, op pink, uvula midline, neck; nttp, no nuchal rigidity, chest: nttp, no crepitus, cv: rrr, +murmurs, lungs: ctab, abd: soft, nontender, nondistended, no peritoneal signs, +BS, no guarding, ext: wwp, neg homans, back: right paraspinal back ttp, and right buttock pain. no deformity. +straight leg raise at 60 degrees right leg.  skin: no rash, neuro: awake and alert, following commands, speech clear, sensation and strength intact, no focal deficits

## 2017-08-23 NOTE — ED PROVIDER NOTE - OBJECTIVE STATEMENT
Attending Johnston: 73 y/o male h/o CAD, DM presenting with lower back pain for the last week. initially pain to b/l sides but now with pain to mostly right lower back. no nausea or vomiting. pain free at rest but when tries to move pain worsens. no numbness or tingling. has had back pain in the past but never to this extent. not testicular pain or dysuria. no recent falls or trauma. no abdominal pain. no neck pain. no h/o malignancy 73 y/o male h/o CAD, DM presenting with lower back pain for the last week. initially pain to b/l sides but now with pain to mostly right lower back. no nausea or vomiting. pain free at rest but when tries to move pain worsens. no numbness or tingling. has had back pain in the past but never to this extent. Reports using acetaminophen with minimal relief. Patient denies not testicular pain or dysuria. no recent falls or trauma. no abdominal pain. no neck pain. no h/o malignancy

## 2017-08-24 LAB
CULTURE RESULTS: NO GROWTH — SIGNIFICANT CHANGE UP
SPECIMEN SOURCE: SIGNIFICANT CHANGE UP

## 2017-10-09 ENCOUNTER — RX RENEWAL (OUTPATIENT)
Age: 72
End: 2017-10-09

## 2017-10-09 RX ORDER — NITROGLYCERIN 0.4 MG/1
0.4 TABLET SUBLINGUAL
Qty: 100 | Refills: 0 | Status: ACTIVE | COMMUNITY
Start: 2017-06-16 | End: 1900-01-01

## 2017-12-04 ENCOUNTER — RX RENEWAL (OUTPATIENT)
Age: 72
End: 2017-12-04

## 2018-01-31 ENCOUNTER — RX RENEWAL (OUTPATIENT)
Age: 73
End: 2018-01-31

## 2018-03-28 ENCOUNTER — RX RENEWAL (OUTPATIENT)
Age: 73
End: 2018-03-28

## 2018-04-24 ENCOUNTER — RX RENEWAL (OUTPATIENT)
Age: 73
End: 2018-04-24

## 2018-04-24 RX ORDER — CLOPIDOGREL BISULFATE 75 MG/1
75 TABLET, FILM COATED ORAL
Qty: 30 | Refills: 0 | Status: ACTIVE | COMMUNITY
Start: 2018-04-24 | End: 1900-01-01

## 2018-05-22 ENCOUNTER — RX RENEWAL (OUTPATIENT)
Age: 73
End: 2018-05-22

## 2018-07-17 ENCOUNTER — RX RENEWAL (OUTPATIENT)
Age: 73
End: 2018-07-17

## 2018-09-12 ENCOUNTER — RX RENEWAL (OUTPATIENT)
Age: 73
End: 2018-09-12

## 2018-11-06 ENCOUNTER — RX RENEWAL (OUTPATIENT)
Age: 73
End: 2018-11-06

## 2019-01-04 ENCOUNTER — RX RENEWAL (OUTPATIENT)
Age: 74
End: 2019-01-04

## 2019-02-25 ENCOUNTER — RX RENEWAL (OUTPATIENT)
Age: 74
End: 2019-02-25

## 2019-04-23 NOTE — H&P ADULT. - PSYCHIATRIC
negative Affect and characteristics of appearance, verbalizations, behaviors are appropriate PAST MEDICAL HISTORY:  Asthmatic bronchitis , chronic     BPH (benign prostatic hyperplasia)     HTN (hypertension)     Osteoarthritis

## 2019-09-10 NOTE — ED PROCEDURE NOTE - CPROC ED INFORMED CONSENT1
Benefits, risks, and possible complications of procedure explained to patient/caregiver who verbalized understanding and gave verbal consent. Number Of Coats: 2 Frost (0,1+,2+,3+,4+): 1+ Post Peel Care: After the procedure, the treatment area was washed with soap and water, and a post-peel cream was applied. Sun protection and post-care instructions were reviewed with the patient. Time (Mins): 1 Prep: The treated area was degreased with pre-peel cleanser. Detail Level: Zone Post-Care Instructions: I reviewed with the patient in detail post-care instructions. Patient should avoid sun exposure and wear sun protection. Chemical Peel: 17.5% TCA Consent: Prior to the procedure, written consent was obtained and risks were reviewed, including but not limited to: redness, peeling, blistering, pigmentary change, scarring, infection, and pain. Treatment Number: 0 Erythema: moderate

## 2019-10-13 NOTE — ED ADULT TRIAGE NOTE - BP NONINVASIVE SYSTOLIC (MM HG)
Dr. Loli Goldstein has reviewed d/c instructions with patient. Opportunity for clarification given. No further questions/concerns voiced at this time. Patient is alert and remains in no acute distress. SBAR given to [facility]. To transport home. 114

## 2020-01-21 ENCOUNTER — RX RENEWAL (OUTPATIENT)
Age: 75
End: 2020-01-21

## 2020-01-21 RX ORDER — HYDRALAZINE HYDROCHLORIDE 50 MG/1
50 TABLET ORAL
Qty: 90 | Refills: 4 | Status: ACTIVE | COMMUNITY
Start: 2017-05-25 | End: 1900-01-01

## 2020-11-10 ENCOUNTER — APPOINTMENT (OUTPATIENT)
Dept: DISASTER EMERGENCY | Facility: CLINIC | Age: 75
End: 2020-11-10

## 2020-11-10 DIAGNOSIS — Z01.818 ENCOUNTER FOR OTHER PREPROCEDURAL EXAMINATION: ICD-10-CM

## 2020-11-11 VITALS
WEIGHT: 164.91 LBS | OXYGEN SATURATION: 98 % | DIASTOLIC BLOOD PRESSURE: 72 MMHG | SYSTOLIC BLOOD PRESSURE: 152 MMHG | HEIGHT: 66 IN

## 2020-11-11 LAB — SARS-COV-2 N GENE NPH QL NAA+PROBE: NOT DETECTED

## 2020-11-11 NOTE — H&P ADULT - HISTORY OF PRESENT ILLNESS
SKELETON     Cardiologist: Dr. Brain Ashby  Pharmacy:   Escort:   COVID: (-) on 11/10/2020 in [facility]     Pt is a 74 y/o Male (smoker/nonsmoker), with FHx of  and PMHx of HTN, HLD, DM II, s/p ppm in 2012, CAD (s/p multiple PCIs - last KAYKAY x 1 LAD in 2016 at [facility]), B/L carotid stenosis (last US: 2016), who presented to his cardiologist, Dr. Ashby, with complaints of intermittent midline, substernal, exertional, chest pain that is associated with SOB. Patient endorses that his symptoms occur daily and are relieved with rest and NG. ECHO 11/07/2020: Anteroapical and apical hypokinesis. LA/RA normal in size. No evidence of valve regurgitation. EF: 50%.     Patient denies CP, SOB, palpitations, orthopnea, LE edema, syncope, n/v, dizziness, diaphoresis, claudication, fever, chills, recent travel, or sick contacts.     In light of patient's risk factors, CCS class III angina equivalent symptoms, and abnormal ...pt to undergo cardiac catheterization with possible intervention if clinically indicated. [facility]KELE[facility]     Cardiologist: Dr. Brain Ashby  Pharmacy: [facility]  Escort:   [facility]     76 y/o Malay Speaking M, POOR HISTORIAN with PMHx of HTN, HLD, DM II, symptomatic bradycardia s/p ppm in 2012, CAD (s/p multiple PCIs – most recent diagnostic cardiac cath 2017 revealing patent pLAD stent, patent dLCx stent, OM2 50 %; EF 55 %, b/l carotid stenosis 50 % on US 2016 who presented to Dr. Ashby’s office for follow up. He hasn’t followed up with Dr. Ashby for several months; he is c/o intermittent CP located midline, substernal,  associated with SOB on ambulating .. which occurs daily which is relieved with rest and worse with exertion…     Patient denies CP, SOB, palpitations, orthopnea, LE edema, syncope, n/v, dizziness, diaphoresis, claudication, fever, chills, recent travel, or sick contacts.  ECHO 11/07/2020: Anteroapical and apical hypokinesis. LA/RA normal in size. No evidence of valve regurgitation. EF: 50%.  In light of patient's risk factors, CCS class __ angina equivalent symptoms, and abnormal ECHO, .pt to undergo cardiac catheterization with possible intervention if clinically indicated.     Confirm meds on arrival !    Cardiologist: Dr. Brain Ashby  Pharmacy: [facility]  Escort:   COVID: (-) on 11/10/2020 in [facility]   History obtained with help of [employer]  ID # [id number]    76 y/o Hungarian Speaking M, Former smoker POOR HISTORIAN with PMHx of HTN, HLD, DM II, symptomatic bradycardia s/p ppm in 2012, CAD (s/p multiple PCIs – most recent diagnostic cardiac cath 2017 revealing patent pLAD stent, patent dLCx stent, OM2 50 %; EF 55 %, b/l carotid stenosis 50 % on US 2016, asthma (denies any hospitalization/intubation BPH with PSA level 15 currently (only monitoring it routinely)  who presented to Dr. Ashby’s office for follow up. He hasn’t followed up with Dr. Ashby for several months; he is c/o intermittent chest pain describes it as heaviness  located midline, substernal,  associated with SOB on ambulating uphill and climbing stairs which occurs daily which is relieved with rest and worse with exertion occurring for the last 6 months; also endorses b/l LE edema X 1 year.  Patient denies  palpitations, orthopnea, syncope, n/v, dizziness, diaphoresis, claudication, fever, chills, recent travel, or sick contacts.  ECHO 11/07/2020: Anteroapical and apical hypokinesis. LA/RA normal in size. No evidence of valve regurgitation. EF: 50%.  In light of patient's risk factors, CCS class 3 angina symptoms, and abnormal ECHO, .pt to undergo cardiac catheterization with possible intervention if clinically indicated.     Confirm meds on arrival !    Cardiologist: Dr. Brain Ashby  Pharmacy: [facility]  Escort:   COVID: (-) on 11/10/2020 in [facility]   History obtained with help of [employer]  ID # [id number]    74 y/o Mongolian Speaking M, Former smoker POOR HISTORIAN with PMHx of HTN, HLD, DM II, symptomatic bradycardia s/p ppm in 2012, CAD (s/p multiple PCIs – most recent diagnostic cardiac cath 2017 revealing patent pLAD stent, patent dLCx stent, OM2 50 %; EF 55 %, b/l carotid stenosis 50 % on US 2016, asthma (denies any hospitalization/intubation BPH with PSA level 15 currently (only monitoring it routinely)  who presented to Dr. Ashby’s office for follow up. He hasn’t followed up with Dr. Ashby for several months; he is c/o intermittent chest pain describes it as heaviness  located midline, substernal,  associated with SOB on ambulating uphill and climbing stairs which occurs daily which is relieved with rest and worse with exertion occurring for the last 6 months; also endorses b/l LE edema X 1 year.  Patient denies  palpitations, orthopnea, syncope, n/v, dizziness, diaphoresis, claudication, fever, chills, recent travel, or sick contacts.  ECHO 11/07/2020: Anteroapical and apical hypokinesis. LA/RA normal in size. No evidence of valve regurgitation. EF: 50%.  In light of patient's risk factors, CCS class 3 angina symptoms, and abnormal ECHO, pt to undergo cardiac catheterization with possible intervention if clinically indicated.

## 2020-11-11 NOTE — H&P ADULT - NSICDXFAMILYHX_GEN_ALL_CORE_FT
FAMILY HISTORY:  Father  Still living? No  Family history of colon cancer, Age at diagnosis: Age Unknown

## 2020-11-11 NOTE — H&P ADULT - NSHPSOCIALHISTORY_GEN_ALL_CORE
denies any use of ETOH or illicit drug use  Former smoker; quit 10 years ago; smoked 1 PPD X 15 years

## 2020-11-11 NOTE — H&P ADULT - NSICDXPASTMEDICALHX_GEN_ALL_CORE_FT
PAST MEDICAL HISTORY:  Arrhythmia     DM (diabetes mellitus)     HLD (hyperlipidemia)     HTN (hypertension)     Pacemaker     Syncope

## 2020-11-11 NOTE — H&P ADULT - ASSESSMENT
74 y/o Hebrew Speaking M, Former smoker POOR HISTORIAN with PMHx of HTN, HLD, DM II, symptomatic bradycardia s/p ppm in 2012, CAD (s/p multiple PCIs – most recent diagnostic cardiac cath 2017 revealing patent pLAD stent, patent dLCx stent, OM2 50 %; EF 55 %, b/l carotid stenosis 50 % on US 2016, asthma (denies any hospitalization/intubation BPH with PSA level 15 currently (only monitoring it routinely)  who presented to Dr. Ashby’s office for follow up. He hasn’t followed up with Dr. Ashby for several months; he is c/o intermittent chest pain describes it as heaviness  located midline, substernal,  associated with SOB on ambulating uphill and climbing stairs which occurs daily which is relieved with rest and worse with exertion occurring for the last 6 months; also endorses b/l LE edema X 1 year.  Patient denies  palpitations, orthopnea, syncope, n/v, dizziness, diaphoresis, claudication, fever, chills, recent travel, or sick contacts.  ECHO 11/07/2020: Anteroapical and apical hypokinesis. LA/RA normal in size. No evidence of valve regurgitation. EF: 50%.  In light of patient's risk factors, CCS class 3 angina symptoms, and abnormal ECHO, .pt to undergo cardiac catheterization with possible intervention if clinically indicated.    ASA:          mallampati:    Risks & benefits of procedure and alternative therapy have been explained to the patient including but not limited to: allergic reaction, bleeding w/possible need for blood transfusion, infection, renal and vascular compromise, limb damage, arrhythmia, stroke, vessel dissection/perforation, Myocardial infarction, emergent CABG. Informed consent obtained and in chart.      Of note: 74 y/o Urdu Speaking M, Former smoker POOR HISTORIAN with PMHx of HTN, HLD, DM II, symptomatic bradycardia s/p ppm in 2012, CAD (s/p multiple PCIs – most recent diagnostic cardiac cath 2017 revealing patent pLAD stent, patent dLCx stent, OM2 50 %; EF 55 %, b/l carotid stenosis 50 % on US 2016, asthma (denies any hospitalization/intubation BPH with PSA level 15 currently (only monitoring it routinely)  who presented to Dr. Ashby’s office for follow up. He hasn’t followed up with Dr. Ashby for several months;  c/o Class II Anginal symptoms and found with an abnormal ECHO 11/07/2020 revealing Anteroapical and apical hypokinesis. LA/RA normal in size. No evidence of valve regurgitation. EF: 50%.     Given patient's risk factors, CCS class 3 angina symptoms, and abnormal ECHO, pt is referred for cardiac catheterization with possible intervention if clinically indicated.    ASA: III          Mallampati: II    Risks & benefits of procedure and alternative therapy have been explained to the patient including but not limited to: allergic reaction, bleeding w/possible need for blood transfusion, infection, renal and vascular compromise, limb damage, arrhythmia, stroke, vessel dissection/perforation, Myocardial infarction, emergent CABG. Informed consent obtained and in chart.    Of note: Pt given maintenance dose ASA 81mg x 1 dose.  Noted on Brilinta  90mg BID in the past, loaded with Brilinta 180mg x 1 dose.  NS @ 75ml x 4h given for hydration.   EF 50% on recent ECHO.

## 2020-11-13 ENCOUNTER — INPATIENT (INPATIENT)
Facility: HOSPITAL | Age: 75
LOS: 0 days | Discharge: HOME | DRG: 251 | End: 2020-11-14
Attending: INTERNAL MEDICINE | Admitting: INTERNAL MEDICINE
Payer: MEDICARE

## 2020-11-13 ENCOUNTER — TRANSCRIPTION ENCOUNTER (OUTPATIENT)
Age: 75
End: 2020-11-13

## 2020-11-13 DIAGNOSIS — Z95.0 PRESENCE OF CARDIAC PACEMAKER: Chronic | ICD-10-CM

## 2020-11-13 LAB
A1C WITH ESTIMATED AVERAGE GLUCOSE RESULT: 8.4 % — HIGH (ref 4–5.6)
ALBUMIN SERPL ELPH-MCNC: 4.7 G/DL — SIGNIFICANT CHANGE UP (ref 3.3–5)
ALP SERPL-CCNC: 53 U/L — SIGNIFICANT CHANGE UP (ref 40–120)
ALT FLD-CCNC: 46 U/L — HIGH (ref 10–45)
ANION GAP SERPL CALC-SCNC: 15 MMOL/L — SIGNIFICANT CHANGE UP (ref 5–17)
APTT BLD: 28.4 SEC — SIGNIFICANT CHANGE UP (ref 27.5–35.5)
AST SERPL-CCNC: 31 U/L — SIGNIFICANT CHANGE UP (ref 10–40)
BASOPHILS # BLD AUTO: 0.07 K/UL — SIGNIFICANT CHANGE UP (ref 0–0.2)
BASOPHILS NFR BLD AUTO: 0.7 % — SIGNIFICANT CHANGE UP (ref 0–2)
BILIRUB SERPL-MCNC: 0.5 MG/DL — SIGNIFICANT CHANGE UP (ref 0.2–1.2)
BUN SERPL-MCNC: 18 MG/DL — SIGNIFICANT CHANGE UP (ref 7–23)
CALCIUM SERPL-MCNC: 10.6 MG/DL — HIGH (ref 8.4–10.5)
CHLORIDE SERPL-SCNC: 98 MMOL/L — SIGNIFICANT CHANGE UP (ref 96–108)
CHOLEST SERPL-MCNC: 137 MG/DL — SIGNIFICANT CHANGE UP
CK MB CFR SERPL CALC: 2.3 NG/ML — SIGNIFICANT CHANGE UP (ref 0–6.7)
CK SERPL-CCNC: 112 U/L — SIGNIFICANT CHANGE UP (ref 30–200)
CO2 SERPL-SCNC: 29 MMOL/L — SIGNIFICANT CHANGE UP (ref 22–31)
CREAT SERPL-MCNC: 1.13 MG/DL — SIGNIFICANT CHANGE UP (ref 0.5–1.3)
EOSINOPHIL # BLD AUTO: 0.17 K/UL — SIGNIFICANT CHANGE UP (ref 0–0.5)
EOSINOPHIL NFR BLD AUTO: 1.7 % — SIGNIFICANT CHANGE UP (ref 0–6)
ESTIMATED AVERAGE GLUCOSE: 194 MG/DL — HIGH (ref 68–114)
GLUCOSE BLDC GLUCOMTR-MCNC: 126 MG/DL — HIGH (ref 70–99)
GLUCOSE BLDC GLUCOMTR-MCNC: 228 MG/DL — HIGH (ref 70–99)
GLUCOSE SERPL-MCNC: 155 MG/DL — HIGH (ref 70–99)
HCT VFR BLD CALC: 40.8 % — SIGNIFICANT CHANGE UP (ref 39–50)
HCV AB S/CO SERPL IA: 0.13 S/CO — SIGNIFICANT CHANGE UP
HCV AB SERPL-IMP: SIGNIFICANT CHANGE UP
HDLC SERPL-MCNC: 45 MG/DL — SIGNIFICANT CHANGE UP
HGB BLD-MCNC: 13.5 G/DL — SIGNIFICANT CHANGE UP (ref 13–17)
IMM GRANULOCYTES NFR BLD AUTO: 1.6 % — HIGH (ref 0–1.5)
INR BLD: 0.97 — SIGNIFICANT CHANGE UP (ref 0.88–1.16)
LIPID PNL WITH DIRECT LDL SERPL: 57 MG/DL — SIGNIFICANT CHANGE UP
LYMPHOCYTES # BLD AUTO: 1.67 K/UL — SIGNIFICANT CHANGE UP (ref 1–3.3)
LYMPHOCYTES # BLD AUTO: 17 % — SIGNIFICANT CHANGE UP (ref 13–44)
MCHC RBC-ENTMCNC: 29.2 PG — SIGNIFICANT CHANGE UP (ref 27–34)
MCHC RBC-ENTMCNC: 33.1 GM/DL — SIGNIFICANT CHANGE UP (ref 32–36)
MCV RBC AUTO: 88.1 FL — SIGNIFICANT CHANGE UP (ref 80–100)
MONOCYTES # BLD AUTO: 0.77 K/UL — SIGNIFICANT CHANGE UP (ref 0–0.9)
MONOCYTES NFR BLD AUTO: 7.8 % — SIGNIFICANT CHANGE UP (ref 2–14)
NEUTROPHILS # BLD AUTO: 7 K/UL — SIGNIFICANT CHANGE UP (ref 1.8–7.4)
NEUTROPHILS NFR BLD AUTO: 71.2 % — SIGNIFICANT CHANGE UP (ref 43–77)
NON HDL CHOLESTEROL: 92 MG/DL — SIGNIFICANT CHANGE UP
NRBC # BLD: 0 /100 WBCS — SIGNIFICANT CHANGE UP (ref 0–0)
PLATELET # BLD AUTO: 238 K/UL — SIGNIFICANT CHANGE UP (ref 150–400)
POTASSIUM SERPL-MCNC: 3.5 MMOL/L — SIGNIFICANT CHANGE UP (ref 3.5–5.3)
POTASSIUM SERPL-SCNC: 3.5 MMOL/L — SIGNIFICANT CHANGE UP (ref 3.5–5.3)
PROT SERPL-MCNC: 7.7 G/DL — SIGNIFICANT CHANGE UP (ref 6–8.3)
PROTHROM AB SERPL-ACNC: 11.6 SEC — SIGNIFICANT CHANGE UP (ref 10.6–13.6)
RBC # BLD: 4.63 M/UL — SIGNIFICANT CHANGE UP (ref 4.2–5.8)
RBC # FLD: 13.2 % — SIGNIFICANT CHANGE UP (ref 10.3–14.5)
SODIUM SERPL-SCNC: 142 MMOL/L — SIGNIFICANT CHANGE UP (ref 135–145)
TRIGL SERPL-MCNC: 174 MG/DL — HIGH
WBC # BLD: 9.84 K/UL — SIGNIFICANT CHANGE UP (ref 3.8–10.5)
WBC # FLD AUTO: 9.84 K/UL — SIGNIFICANT CHANGE UP (ref 3.8–10.5)

## 2020-11-13 PROCEDURE — 99235 HOSP IP/OBS SAME DATE MOD 70: CPT

## 2020-11-13 PROCEDURE — 92920 PRQ TRLUML C ANGIOP 1ART&/BR: CPT | Mod: LD

## 2020-11-13 PROCEDURE — 93458 L HRT ARTERY/VENTRICLE ANGIO: CPT | Mod: 26,59

## 2020-11-13 RX ORDER — LOSARTAN/HYDROCHLOROTHIAZIDE 100MG-25MG
1 TABLET ORAL
Qty: 0 | Refills: 0 | DISCHARGE

## 2020-11-13 RX ORDER — SODIUM CHLORIDE 9 MG/ML
500 INJECTION INTRAMUSCULAR; INTRAVENOUS; SUBCUTANEOUS
Refills: 0 | Status: DISCONTINUED | OUTPATIENT
Start: 2020-11-13 | End: 2020-11-13

## 2020-11-13 RX ORDER — CARVEDILOL PHOSPHATE 80 MG/1
25 CAPSULE, EXTENDED RELEASE ORAL EVERY 12 HOURS
Refills: 0 | Status: DISCONTINUED | OUTPATIENT
Start: 2020-11-13 | End: 2020-11-14

## 2020-11-13 RX ORDER — MAGNESIUM OXIDE 400 MG ORAL TABLET 241.3 MG
1 TABLET ORAL
Qty: 0 | Refills: 0 | DISCHARGE

## 2020-11-13 RX ORDER — ATORVASTATIN CALCIUM 80 MG/1
1 TABLET, FILM COATED ORAL
Qty: 0 | Refills: 0 | DISCHARGE

## 2020-11-13 RX ORDER — CHLORHEXIDINE GLUCONATE 213 G/1000ML
1 SOLUTION TOPICAL ONCE
Refills: 0 | Status: DISCONTINUED | OUTPATIENT
Start: 2020-11-13 | End: 2020-11-13

## 2020-11-13 RX ORDER — COLESEVELAM HYDROCHLORIDE 625 MG/1
3 TABLET, FILM COATED ORAL
Qty: 0 | Refills: 0 | DISCHARGE

## 2020-11-13 RX ORDER — NILOTINIB 50 MG/1
300 CAPSULE ORAL EVERY 12 HOURS
Refills: 0 | Status: DISCONTINUED | OUTPATIENT
Start: 2020-11-13 | End: 2020-11-13

## 2020-11-13 RX ORDER — POTASSIUM CHLORIDE 20 MEQ
40 PACKET (EA) ORAL ONCE
Refills: 0 | Status: COMPLETED | OUTPATIENT
Start: 2020-11-13 | End: 2020-11-13

## 2020-11-13 RX ORDER — OMEGA-3 ACID ETHYL ESTERS 1 G
1 CAPSULE ORAL
Qty: 0 | Refills: 0 | DISCHARGE

## 2020-11-13 RX ORDER — HYDRALAZINE HCL 50 MG
0 TABLET ORAL
Qty: 0 | Refills: 0 | DISCHARGE

## 2020-11-13 RX ORDER — OMEGA-3 ACID ETHYL ESTERS 1 G
0 CAPSULE ORAL
Qty: 0 | Refills: 0 | DISCHARGE

## 2020-11-13 RX ORDER — TAMSULOSIN HYDROCHLORIDE 0.4 MG/1
0.4 CAPSULE ORAL AT BEDTIME
Refills: 0 | Status: DISCONTINUED | OUTPATIENT
Start: 2020-11-13 | End: 2020-11-14

## 2020-11-13 RX ORDER — NILOTINIB 50 MG/1
2 CAPSULE ORAL
Qty: 0 | Refills: 0 | DISCHARGE

## 2020-11-13 RX ORDER — GABAPENTIN 400 MG/1
0 CAPSULE ORAL
Qty: 0 | Refills: 0 | DISCHARGE

## 2020-11-13 RX ORDER — TICAGRELOR 90 MG/1
180 TABLET ORAL ONCE
Refills: 0 | Status: COMPLETED | OUTPATIENT
Start: 2020-11-13 | End: 2020-11-13

## 2020-11-13 RX ORDER — IPRATROPIUM BROMIDE 0.2 MG/ML
2 SOLUTION, NON-ORAL INHALATION
Qty: 0 | Refills: 0 | DISCHARGE

## 2020-11-13 RX ORDER — TICAGRELOR 90 MG/1
90 TABLET ORAL EVERY 12 HOURS
Refills: 0 | Status: DISCONTINUED | OUTPATIENT
Start: 2020-11-14 | End: 2020-11-14

## 2020-11-13 RX ORDER — IPRATROPIUM BROMIDE 0.2 MG/ML
1 SOLUTION, NON-ORAL INHALATION
Qty: 0 | Refills: 0 | DISCHARGE

## 2020-11-13 RX ORDER — FUROSEMIDE 40 MG
0 TABLET ORAL
Qty: 0 | Refills: 0 | DISCHARGE

## 2020-11-13 RX ORDER — CARVEDILOL PHOSPHATE 80 MG/1
1 CAPSULE, EXTENDED RELEASE ORAL
Qty: 0 | Refills: 0 | DISCHARGE

## 2020-11-13 RX ORDER — GABAPENTIN 400 MG/1
100 CAPSULE ORAL DAILY
Refills: 0 | Status: DISCONTINUED | OUTPATIENT
Start: 2020-11-13 | End: 2020-11-14

## 2020-11-13 RX ORDER — NITROGLYCERIN 6.5 MG
1 CAPSULE, EXTENDED RELEASE ORAL
Qty: 0 | Refills: 0 | DISCHARGE

## 2020-11-13 RX ORDER — ASPIRIN/CALCIUM CARB/MAGNESIUM 324 MG
81 TABLET ORAL ONCE
Refills: 0 | Status: COMPLETED | OUTPATIENT
Start: 2020-11-13 | End: 2020-11-13

## 2020-11-13 RX ORDER — ASPIRIN/CALCIUM CARB/MAGNESIUM 324 MG
81 TABLET ORAL DAILY
Refills: 0 | Status: DISCONTINUED | OUTPATIENT
Start: 2020-11-13 | End: 2020-11-14

## 2020-11-13 RX ORDER — FUROSEMIDE 40 MG
20 TABLET ORAL DAILY
Refills: 0 | Status: DISCONTINUED | OUTPATIENT
Start: 2020-11-14 | End: 2020-11-14

## 2020-11-13 RX ORDER — SODIUM CHLORIDE 9 MG/ML
500 INJECTION INTRAMUSCULAR; INTRAVENOUS; SUBCUTANEOUS
Refills: 0 | Status: DISCONTINUED | OUTPATIENT
Start: 2020-11-13 | End: 2020-11-14

## 2020-11-13 RX ORDER — TAMSULOSIN HYDROCHLORIDE 0.4 MG/1
1 CAPSULE ORAL
Qty: 0 | Refills: 0 | DISCHARGE

## 2020-11-13 RX ADMIN — SODIUM CHLORIDE 75 MILLILITER(S): 9 INJECTION INTRAMUSCULAR; INTRAVENOUS; SUBCUTANEOUS at 16:28

## 2020-11-13 RX ADMIN — Medication 40 MILLIEQUIVALENT(S): at 16:58

## 2020-11-13 RX ADMIN — TICAGRELOR 180 MILLIGRAM(S): 90 TABLET ORAL at 16:28

## 2020-11-13 RX ADMIN — Medication 81 MILLIGRAM(S): at 16:28

## 2020-11-13 RX ADMIN — TAMSULOSIN HYDROCHLORIDE 0.4 MILLIGRAM(S): 0.4 CAPSULE ORAL at 22:09

## 2020-11-13 NOTE — DISCHARGE NOTE PROVIDER - NSDCMRMEDTOKEN_GEN_ALL_CORE_FT
Aspir 81 oral delayed release tablet: 1 tab(s) orally once a day  Atrovent HFA 17 mcg/inh inhalation aerosol: 2 puff(s) inhaled 4 times a day  carvedilol 25 mg oral tablet: 1 tab(s) orally 2 times a day  furosemide 20 mg oral tablet:   gabapentin 100 mg oral tablet:   losartan-hydrochlorothiazide 100 mg-25 mg oral tablet: 1 tab(s) orally once a day  metFORMIN 500 mg oral tablet: 1 tab(s) orally 2 times a day  Omega-3 oral capsule:   tamsulosin 0.4 mg oral capsule: 1 cap(s) orally once a day  Tasigna 150 mg oral capsule: 2 cap(s) orally every 12 hours   Aspir 81 oral delayed release tablet: 1 tab(s) orally once a day  atorvastatin 10 mg oral tablet: 1 tab(s) orally once a day   Atrovent HFA 17 mcg/inh inhalation aerosol: 2 puff(s) inhaled 4 times a day  carvedilol 25 mg oral tablet: 1 tab(s) orally 2 times a day  furosemide 20 mg oral tablet:   gabapentin 100 mg oral tablet:   losartan-hydrochlorothiazide 100 mg-25 mg oral tablet: 1 tab(s) orally once a day  metFORMIN 500 mg oral tablet: 1 tab(s) orally 2 times a day HOLD FOR 2 DAYS restart on 11/16/20  Omega-3 oral capsule:   Plavix 75 mg oral tablet: 1 tab(s) orally once   tamsulosin 0.4 mg oral capsule: 1 cap(s) orally once a day  Tasigna 150 mg oral capsule: 2 cap(s) orally every 12 hours

## 2020-11-13 NOTE — DISCHARGE NOTE PROVIDER - NSDCFUADDINST_GEN_ALL_CORE_FT
You underwent a coronary angiogram and should wait 3 days before returning to ordinary activities. Do not drive for 2 days. Consult your doctor before returning to vigorous activity. You may return to work in 3-5 days. The catheter from your wrist was removed and you should remove the dressing in 24 hours. You may shower once the dressing is removed, but avoid baths, hot tubs, or swimming for 5 days to prevent infection. If you notice bleeding from the site, hardening and pain at the site, drainage or redness from the site, coolness/paleness of the extremity, swelling, or fever, please call [phone]. Please continue your aspirin and Plavix as prescribed unless otherwise indicated by your cardiologist. All of your prescriptions have been sent to the pharmacy. Please follow up with Dr. Ashby in 1-2 weeks. All of your needed prescriptions have been sent

## 2020-11-13 NOTE — DISCHARGE NOTE PROVIDER - CARE PROVIDER_API CALL
Brain Ashby)  Cardiovascular Disease; Internal Medicine  [address] [place] [address]  Phone: [phone]  Fax: [phone]  Follow Up Time:

## 2020-11-13 NOTE — DISCHARGE NOTE PROVIDER - NSDCCPCAREPLAN_GEN_ALL_CORE_FT
PRINCIPAL DISCHARGE DIAGNOSIS  Diagnosis: Coronary artery disease  Assessment and Plan of Treatment: You have a diagnosis of coronary artery disease and have been started on daily aspirin and Brilinta. You have a diagnosis of coronary artery disease and received a stent to your coronary artery. You should continue daily aspirin and Brilinta to ensure your stent does not close. DO NOT STOP THESE MEDICATIONS FOR ANY REASON UNLESS OTHERWISE INDICATED BY YOUR CARDIOLOGIST BECAUSE THIS WILL PUT YOU AT RISK FOR A HEART ATTACK. You should refrain from strenuous activity and heavy lifting for 1 week. Please make a follow up appointment with your cardiologist within 1-2 weeks of your discharge. All of your prescriptions have been sent electronically to your pharmacy.   -You were started on aspirin 81mg orally daily, Brilinta 90mg orally twice daily, and carvedilol 25mg orally twice daily  -Follow up with cardiologist in 1-2 weeks      SECONDARY DISCHARGE DIAGNOSES  Diagnosis: Hypertension  Assessment and Plan of Treatment: You have a history of elevated blood pressure and you should continue your blood pressure medications as prescribed.   -monitor blood pressure daily with goal <140/90    Diagnosis: Diabetes  Assessment and Plan of Treatment: You have a diagnosis of diabetes and should continue all of your diabetic medications as prescribed. Please do not take your metformin for 2 days, you may restart your metformin on 11/16 to prevent interaction with the contrast you recieved.     PRINCIPAL DISCHARGE DIAGNOSIS  Diagnosis: Coronary artery disease  Assessment and Plan of Treatment: You have a diagnosis of coronary artery disease and have been started on daily aspirin and plavix You have a diagnosis of coronary artery disease and received a stent to your coronary artery. You should continue daily aspirin and plavix to ensure your stent does not close. DO NOT STOP THESE MEDICATIONS FOR ANY REASON UNLESS OTHERWISE INDICATED BY YOUR CARDIOLOGIST BECAUSE THIS WILL PUT YOU AT RISK FOR A HEART ATTACK. You should refrain from strenuous activity and heavy lifting for 1 week. Please make a follow up appointment with your cardiologist within 1-2 weeks of your discharge. All of your prescriptions have been sent electronically to your pharmacy.   -You were started on aspirin 81mg orally Plavix 75mg orally twice daily, and carvedilol 25mg orally twice daily  -Follow up with cardiologist in 1-2 weeks      SECONDARY DISCHARGE DIAGNOSES  Diagnosis: Hypertension  Assessment and Plan of Treatment: You have a history of elevated blood pressure and you should continue your blood pressure medications as prescribed.   -monitor blood pressure daily with goal <140/90    Diagnosis: Diabetes  Assessment and Plan of Treatment: You have a diagnosis of diabetes and should continue all of your diabetic medications as prescribed. Please do not take your metformin for 2 days, you may restart your metformin on 11/16 to prevent interaction with the contrast you recieved.

## 2020-11-13 NOTE — DISCHARGE NOTE PROVIDER - HOSPITAL COURSE
History obtained with help of [employer] [place]  ID # [id number]    74 y/o Green[place]ic Speaking M, Former smoker POOR HISTORIAN with PMHx of HTN, HLD, DM II, symptomatic bradycardia s/p ppm in 2012, CAD (s/p multiple PCIs – most recent diagnostic cardiac cath 2017 revealing patent pLAD stent, patent dLCx stent, OM2 50 %; EF 55 %, b/l carotid stenosis 50 % on US 2016, asthma (denies any hospitalization/intubation BPH with PSA level 15 currently (only monitoring it routinely)  who presented to Dr. Ashby’s office for follow up. He hasn’t followed up with Dr. Ashby for several months; he is c/o intermittent chest pain describes it as heaviness  located midline, substernal,  associated with SOB on ambulating uphill and climbing stairs which occurs daily which is relieved with rest and worse with exertion occurring for the last 6 months; also endorses b/l LE edema X 1 year.  Patient denies  palpitations, orthopnea, syncope, n/v, dizziness, diaphoresis, claudication, fever, chills, recent travel, or sick contacts.  ECHO 11/07/2020: Anteroapical and apical hypokinesis. LA/RA normal in size. No evidence of valve regurgitation. EF: 50%.  In light of patient's risk factors, CCS class 3 angina symptoms, and abnormal ECHO, pt to undergo cardiac catheterization with possible intervention if clinically indicated.    Pt is s/p cath 11/13 received PTCA/angiosculpt mLAD ISR. R radial band removed without complications. Overnight, no events. This AM, feels well, denies CP, SOB, n/v/d, LE edema. VSS, no events on tele, labs unremarkable. PE sig for R radial site stable c/d/i, no bleeding, pain or hematoma. Patient was seen and examined by attending who agrees with above d/c plan. All meds rx to pharmacy and explained to patient. Patient instructed to return if sx worsen including not limited to chest pain, shortness of breath History obtained with help of [employer] [facility]  ID # [id number]    76 y/o Yi Speaking M, Former smoker POOR HISTORIAN with PMHx of HTN, HLD, DM II, symptomatic bradycardia s/p ppm in 2012, CAD (s/p multiple PCIs – most recent diagnostic cardiac cath 2017 revealing patent pLAD stent, patent dLCx stent, OM2 50 %; EF 55 %, b/l carotid stenosis 50 % on US 2016, asthma (denies any hospitalization/intubation BPH with PSA level 15 currently (only monitoring it routinely)  who presented to Dr. Ashby’s office for follow up. He hasn’t followed up with Dr. Ashby for several months; he is c/o intermittent chest pain describes it as heaviness  located midline, substernal,  associated with SOB on ambulating uphill and climbing stairs which occurs daily which is relieved with rest and worse with exertion occurring for the last 6 months; also endorses b/l LE edema X 1 year.  Patient denies  palpitations, orthopnea, syncope, n/v, dizziness, diaphoresis, claudication, fever, chills, recent travel, or sick contacts.  ECHO 11/07/2020: Anteroapical and apical hypokinesis. LA/RA normal in size. No evidence of valve regurgitation. EF: 50%.  In light of patient's risk factors, CCS class 3 angina symptoms, and abnormal ECHO, pt to undergo cardiac catheterization with possible intervention if clinically indicated.    Pt is s/p cath 11/13 received PTCA/angiosculpt mLAD ISR. R radial band removed without complications. Overnight, no events. This AM, feels well, denies CP, SOB, n/v/d, LE edema. VSS, no events on tele, labs unremarkable. PE sig for R radial site stable c/d/i, no bleeding, pain or hematoma. Per Dr. Ashby patient should be changed from Brilinta to Plavix, Patient was loaded with Plavix 600mg x 1 prior to discharge. Patient also started on Atorvastatin 10mg per Dr. Ashby. Patient home medications reviewed and patient will continue Aspirin 81mg, Plavix 75mg, Lasix 20mg daily, tamsulosin 0.4mg daily, carvedilol 25mg BID  and Patient was seen and examined by attending who agrees with above d/c plan. All meds rx to pharmacy and explained to patient. Patient instructed to return if sx worsen including not limited to chest pain, shortness of breath

## 2020-11-14 ENCOUNTER — TRANSCRIPTION ENCOUNTER (OUTPATIENT)
Age: 75
End: 2020-11-14

## 2020-11-14 VITALS — TEMPERATURE: 97 F

## 2020-11-14 LAB
ANION GAP SERPL CALC-SCNC: 14 MMOL/L — SIGNIFICANT CHANGE UP (ref 5–17)
BUN SERPL-MCNC: 20 MG/DL — SIGNIFICANT CHANGE UP (ref 7–23)
CALCIUM SERPL-MCNC: 9.3 MG/DL — SIGNIFICANT CHANGE UP (ref 8.4–10.5)
CHLORIDE SERPL-SCNC: 102 MMOL/L — SIGNIFICANT CHANGE UP (ref 96–108)
CO2 SERPL-SCNC: 26 MMOL/L — SIGNIFICANT CHANGE UP (ref 22–31)
CREAT SERPL-MCNC: 1.13 MG/DL — SIGNIFICANT CHANGE UP (ref 0.5–1.3)
GLUCOSE BLDC GLUCOMTR-MCNC: 201 MG/DL — HIGH (ref 70–99)
GLUCOSE SERPL-MCNC: 175 MG/DL — HIGH (ref 70–99)
HCT VFR BLD CALC: 39.5 % — SIGNIFICANT CHANGE UP (ref 39–50)
HGB BLD-MCNC: 13.1 G/DL — SIGNIFICANT CHANGE UP (ref 13–17)
MAGNESIUM SERPL-MCNC: 2.2 MG/DL — SIGNIFICANT CHANGE UP (ref 1.6–2.6)
MCHC RBC-ENTMCNC: 29.8 PG — SIGNIFICANT CHANGE UP (ref 27–34)
MCHC RBC-ENTMCNC: 33.2 GM/DL — SIGNIFICANT CHANGE UP (ref 32–36)
MCV RBC AUTO: 90 FL — SIGNIFICANT CHANGE UP (ref 80–100)
NRBC # BLD: 0 /100 WBCS — SIGNIFICANT CHANGE UP (ref 0–0)
PLATELET # BLD AUTO: 229 K/UL — SIGNIFICANT CHANGE UP (ref 150–400)
POTASSIUM SERPL-MCNC: 4.2 MMOL/L — SIGNIFICANT CHANGE UP (ref 3.5–5.3)
POTASSIUM SERPL-SCNC: 4.2 MMOL/L — SIGNIFICANT CHANGE UP (ref 3.5–5.3)
RBC # BLD: 4.39 M/UL — SIGNIFICANT CHANGE UP (ref 4.2–5.8)
RBC # FLD: 13.2 % — SIGNIFICANT CHANGE UP (ref 10.3–14.5)
SODIUM SERPL-SCNC: 142 MMOL/L — SIGNIFICANT CHANGE UP (ref 135–145)
WBC # BLD: 8.42 K/UL — SIGNIFICANT CHANGE UP (ref 3.8–10.5)
WBC # FLD AUTO: 8.42 K/UL — SIGNIFICANT CHANGE UP (ref 3.8–10.5)

## 2020-11-14 PROCEDURE — 99232 SBSQ HOSP IP/OBS MODERATE 35: CPT

## 2020-11-14 RX ORDER — CLOPIDOGREL BISULFATE 75 MG/1
600 TABLET, FILM COATED ORAL ONCE
Refills: 0 | Status: COMPLETED | OUTPATIENT
Start: 2020-11-14 | End: 2020-11-14

## 2020-11-14 RX ORDER — METFORMIN HYDROCHLORIDE 850 MG/1
1 TABLET ORAL
Qty: 0 | Refills: 0 | DISCHARGE

## 2020-11-14 RX ORDER — ATORVASTATIN CALCIUM 80 MG/1
1 TABLET, FILM COATED ORAL
Qty: 30 | Refills: 11
Start: 2020-11-14 | End: 2021-11-08

## 2020-11-14 RX ORDER — ASPIRIN/CALCIUM CARB/MAGNESIUM 324 MG
1 TABLET ORAL
Qty: 30 | Refills: 11
Start: 2020-11-14 | End: 2021-11-08

## 2020-11-14 RX ORDER — CLOPIDOGREL BISULFATE 75 MG/1
1 TABLET, FILM COATED ORAL
Qty: 30 | Refills: 11
Start: 2020-11-14 | End: 2021-11-08

## 2020-11-14 RX ADMIN — CARVEDILOL PHOSPHATE 25 MILLIGRAM(S): 80 CAPSULE, EXTENDED RELEASE ORAL at 06:04

## 2020-11-14 RX ADMIN — TICAGRELOR 90 MILLIGRAM(S): 90 TABLET ORAL at 06:04

## 2020-11-14 RX ADMIN — GABAPENTIN 100 MILLIGRAM(S): 400 CAPSULE ORAL at 11:04

## 2020-11-14 RX ADMIN — CLOPIDOGREL BISULFATE 600 MILLIGRAM(S): 75 TABLET, FILM COATED ORAL at 11:04

## 2020-11-14 RX ADMIN — Medication 81 MILLIGRAM(S): at 11:04

## 2020-11-14 RX ADMIN — Medication 20 MILLIGRAM(S): at 06:04

## 2020-11-14 NOTE — CHART NOTE - NSCHARTNOTEFT_GEN_A_CORE
Single Vessel CAD  Syntax score low  Frailty index 1    Coronary angiogram  LM: normal  LAD: prox lpatent stent , mid 70-80% ISR Tx with 3.5 cutting balloon  LCx: patent stent mild luminal irregularities  RCA: Large, dominant vessel with mild luminal irregularities    Left heart catheterization  LVEF: 60%   LVEDP: 10mmHg  No AS/MR    Intervention:  Successful PCI of 70-80% LAD ISR treated with a 3.5 x 10 angiosculpt balloon    Radial band placed on the right radial access site with adequate hemostasis prior to leaving the cardiac catheterization lab.     No procedural complications    Reason for admission: ISR, significant hypertension during the procedure and poorly controlled HTN

## 2020-11-14 NOTE — DISCHARGE NOTE NURSING/CASE MANAGEMENT/SOCIAL WORK - PATIENT PORTAL LINK FT
You can access the FollowMyHealth Patient Portal offered by [facility] by registering at the following website: [email or web address]. By joining [facility]’s FollowMyHealth portal, you will also be able to view your health information using other applications (apps) compatible with our system.
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
## 2020-11-19 DIAGNOSIS — I25.118 ATHEROSCLEROTIC HEART DISEASE OF NATIVE CORONARY ARTERY WITH OTHER FORMS OF ANGINA PECTORIS: ICD-10-CM

## 2020-11-19 DIAGNOSIS — Z79.84 LONG TERM (CURRENT) USE OF ORAL HYPOGLYCEMIC DRUGS: ICD-10-CM

## 2020-11-19 DIAGNOSIS — Z79.51 LONG TERM (CURRENT) USE OF INHALED STEROIDS: ICD-10-CM

## 2020-11-19 DIAGNOSIS — Z79.82 LONG TERM (CURRENT) USE OF ASPIRIN: ICD-10-CM

## 2020-11-19 DIAGNOSIS — I10 ESSENTIAL (PRIMARY) HYPERTENSION: ICD-10-CM

## 2020-11-19 DIAGNOSIS — T82.855A STENOSIS OF CORONARY ARTERY STENT, INITIAL ENCOUNTER: ICD-10-CM

## 2020-11-19 DIAGNOSIS — Z95.0 PRESENCE OF CARDIAC PACEMAKER: ICD-10-CM

## 2020-11-19 DIAGNOSIS — Z87.891 PERSONAL HISTORY OF NICOTINE DEPENDENCE: ICD-10-CM

## 2020-11-19 DIAGNOSIS — J45.909 UNSPECIFIED ASTHMA, UNCOMPLICATED: ICD-10-CM

## 2020-11-19 DIAGNOSIS — E11.9 TYPE 2 DIABETES MELLITUS WITHOUT COMPLICATIONS: ICD-10-CM

## 2020-11-19 DIAGNOSIS — Z95.5 PRESENCE OF CORONARY ANGIOPLASTY IMPLANT AND GRAFT: ICD-10-CM

## 2020-11-19 DIAGNOSIS — I65.23 OCCLUSION AND STENOSIS OF BILATERAL CAROTID ARTERIES: ICD-10-CM

## 2020-11-19 DIAGNOSIS — N40.0 BENIGN PROSTATIC HYPERPLASIA WITHOUT LOWER URINARY TRACT SYMPTOMS: ICD-10-CM

## 2020-12-02 PROCEDURE — 80048 BASIC METABOLIC PNL TOTAL CA: CPT

## 2020-12-02 PROCEDURE — 80053 COMPREHEN METABOLIC PANEL: CPT

## 2020-12-02 PROCEDURE — C1887: CPT

## 2020-12-02 PROCEDURE — 82962 GLUCOSE BLOOD TEST: CPT

## 2020-12-02 PROCEDURE — 83036 HEMOGLOBIN GLYCOSYLATED A1C: CPT

## 2020-12-02 PROCEDURE — 82550 ASSAY OF CK (CPK): CPT

## 2020-12-02 PROCEDURE — 83735 ASSAY OF MAGNESIUM: CPT

## 2020-12-02 PROCEDURE — 86803 HEPATITIS C AB TEST: CPT

## 2020-12-02 PROCEDURE — 85027 COMPLETE CBC AUTOMATED: CPT

## 2020-12-02 PROCEDURE — 80061 LIPID PANEL: CPT

## 2020-12-02 PROCEDURE — 85730 THROMBOPLASTIN TIME PARTIAL: CPT

## 2020-12-02 PROCEDURE — C1769: CPT

## 2020-12-02 PROCEDURE — 82553 CREATINE MB FRACTION: CPT

## 2020-12-02 PROCEDURE — 85610 PROTHROMBIN TIME: CPT

## 2020-12-02 PROCEDURE — C1894: CPT

## 2020-12-02 PROCEDURE — 36415 COLL VENOUS BLD VENIPUNCTURE: CPT

## 2020-12-02 PROCEDURE — 85025 COMPLETE CBC W/AUTO DIFF WBC: CPT

## 2020-12-02 PROCEDURE — C1725: CPT
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
## 2021-02-20 NOTE — DISCHARGE NOTE NURSING/CASE MANAGEMENT/SOCIAL WORK - NSDCVIVACCINE_GEN_ALL_CORE_FT
No Vaccines Administered.
Patient requests all Lab and Radiology Results on their Discharge Instructions

## 2021-08-02 ENCOUNTER — APPOINTMENT (OUTPATIENT)
Dept: CARDIOLOGY | Facility: CLINIC | Age: 76
End: 2021-08-02
Payer: MEDICARE

## 2021-08-02 VITALS
OXYGEN SATURATION: 100 % | BODY MASS INDEX: 27.8 KG/M2 | HEART RATE: 78 BPM | WEIGHT: 173 LBS | HEIGHT: 66 IN | RESPIRATION RATE: 16 BRPM | TEMPERATURE: 97.8 F | SYSTOLIC BLOOD PRESSURE: 161 MMHG | DIASTOLIC BLOOD PRESSURE: 82 MMHG

## 2021-08-02 DIAGNOSIS — Z95.0 PRESENCE OF CARDIAC PACEMAKER: ICD-10-CM

## 2021-08-02 DIAGNOSIS — Z98.61 CORONARY ANGIOPLASTY STATUS: ICD-10-CM

## 2021-08-02 DIAGNOSIS — E78.5 HYPERLIPIDEMIA, UNSPECIFIED: ICD-10-CM

## 2021-08-02 DIAGNOSIS — I25.10 ATHEROSCLEROTIC HEART DISEASE OF NATIVE CORONARY ARTERY W/OUT ANGINA PECTORIS: ICD-10-CM

## 2021-08-02 PROCEDURE — 99214 OFFICE O/P EST MOD 30 MIN: CPT

## 2021-08-02 PROCEDURE — 93000 ELECTROCARDIOGRAM COMPLETE: CPT

## 2021-08-02 RX ORDER — CARVEDILOL 12.5 MG/1
12.5 TABLET, FILM COATED ORAL
Refills: 0 | Status: ACTIVE | COMMUNITY
Start: 2021-08-02

## 2021-08-02 RX ORDER — ATORVASTATIN CALCIUM 10 MG/1
10 TABLET, FILM COATED ORAL
Refills: 0 | Status: ACTIVE | COMMUNITY
Start: 2021-08-02

## 2021-08-02 RX ORDER — AMLODIPINE BESYLATE 5 MG/1
5 TABLET ORAL
Refills: 0 | Status: ACTIVE | COMMUNITY
Start: 2021-08-02

## 2021-08-02 RX ORDER — HYDRALAZINE HYDROCHLORIDE 50 MG/1
50 TABLET ORAL
Refills: 0 | Status: ACTIVE | COMMUNITY
Start: 2021-08-02

## 2021-08-02 RX ORDER — METFORMIN HYDROCHLORIDE 500 MG/1
500 TABLET, COATED ORAL
Refills: 0 | Status: ACTIVE | COMMUNITY
Start: 2021-08-02

## 2021-08-02 RX ORDER — FUROSEMIDE 20 MG/1
20 TABLET ORAL
Refills: 0 | Status: ACTIVE | COMMUNITY
Start: 2021-08-02

## 2021-08-02 RX ORDER — GLIPIZIDE 2.5 MG/1
2.5 TABLET, FILM COATED, EXTENDED RELEASE ORAL
Refills: 0 | Status: ACTIVE | COMMUNITY
Start: 2021-08-02

## 2021-08-02 RX ORDER — CLOPIDOGREL BISULFATE 75 MG/1
75 TABLET, FILM COATED ORAL
Refills: 0 | Status: ACTIVE | COMMUNITY
Start: 2021-08-02

## 2021-08-02 NOTE — DISCUSSION/SUMMARY
[FreeTextEntry1] : 76 M HTN hyperlipidemia CAD s/p PCI PPM for colonoscopy.\par PATIENT IS AT AN INTERMEDIATE LEVEL OF CARDIAC RISK FOR LOW RISK PROCEDURE.\par STOP ASA AND PLAVIX 5 DAYS PRIOR TO PROCEDURE.\par Continue medications for HTN and hyperlipidemia.\par EP f/u for PPM check.\par EKG for PPM.

## 2021-08-02 NOTE — HISTORY OF PRESENT ILLNESS
[FreeTextEntry1] :   \par 1. Hypertension: controlled. No SE noted.\par  \par 2. Hyperlipidemia: on statin.  The lipid profile is followed by PMD.\par 3. PPM: according to the patient, his most recent PPM check was normal. His next visit is scheduled for next visit.\par \par 4. CAD/ PCI: patient underwent PCI of the LAD at [facility] in 6/2016. He had cardiac cath in 2/2017 which showed patient stents. He is on ASA and plavix (no Brillinta due to hx of epistaxis).  He underwent cardiac cath that showed LAD stenosis and underwent POBA at [facility] in .   \par \par 5. Carotid stenosis: bilateral carotid stenosis 50% on previous US in 2016. No new symptoms noted.\par \par He received his COVID vaccine.\par \par He is for colonoscopy.\par \par He denies CP or SOB. No cough or fevers noted.

## 2021-08-02 NOTE — REASON FOR VISIT
[Hyperlipidemia] : hyperlipidemia [Hypertension] : hypertension [Coronary Artery Disease] : coronary artery disease [FreeTextEntry1] : 76 M HTN hyperlipidemia CAD s/p PCI PPM for colonoscopy.

## 2021-08-02 NOTE — PHYSICAL EXAM
[Well Developed] : well developed [Well Nourished] : well nourished [No Acute Distress] : no acute distress [Normal Conjunctiva] : normal conjunctiva [Normal Venous Pressure] : normal venous pressure [No Carotid Bruit] : no carotid bruit [Normal S1, S2] : normal S1, S2 [No Murmur] : no murmur [No Gallop] : no gallop [Clear Lung Fields] : clear lung fields [Good Air Entry] : good air entry [No Respiratory Distress] : no respiratory distress  [Soft] : abdomen soft [Non Tender] : non-tender [No Masses/organomegaly] : no masses/organomegaly [Normal Bowel Sounds] : normal bowel sounds [Normal Gait] : normal gait [No Edema] : no edema [No Cyanosis] : no cyanosis [No Clubbing] : no clubbing [No Varicosities] : no varicosities [No Rash] : no rash [No Skin Lesions] : no skin lesions [Moves all extremities] : moves all extremities [No Focal Deficits] : no focal deficits [Normal Speech] : normal speech [Alert and Oriented] : alert and oriented [Normal memory] : normal memory [de-identified] : + PPM

## 2021-09-20 ENCOUNTER — APPOINTMENT (OUTPATIENT)
Dept: CARDIOLOGY | Facility: CLINIC | Age: 76
End: 2021-09-20
Payer: MEDICARE

## 2021-09-20 VITALS
OXYGEN SATURATION: 96 % | WEIGHT: 172 LBS | RESPIRATION RATE: 16 BRPM | DIASTOLIC BLOOD PRESSURE: 80 MMHG | BODY MASS INDEX: 27.76 KG/M2 | SYSTOLIC BLOOD PRESSURE: 170 MMHG | HEART RATE: 79 BPM | TEMPERATURE: 97.4 F

## 2021-09-20 PROCEDURE — 93000 ELECTROCARDIOGRAM COMPLETE: CPT

## 2021-09-20 PROCEDURE — 99214 OFFICE O/P EST MOD 30 MIN: CPT

## 2021-09-20 NOTE — DISCUSSION/SUMMARY
[FreeTextEntry1] : 76 M HTN hyperlipidemia CAD s/p PCI PPM for f/u.\par Patient is scheduled for echo and NST.\par Continue ASA 81 and plavix 75 for CAD s/p PCI.\par Continue medications for HTN.\par Continue statin for hyperlipidemia.\par

## 2021-09-20 NOTE — REASON FOR VISIT
[Hyperlipidemia] : hyperlipidemia [Hypertension] : hypertension [Coronary Artery Disease] : coronary artery disease [FreeTextEntry1] : 76 M HTN hyperlipidemia CAD s/p PCI PPM for f/u.

## 2021-09-20 NOTE — PHYSICAL EXAM
[Well Developed] : well developed [Well Nourished] : well nourished [No Acute Distress] : no acute distress [Normal Conjunctiva] : normal conjunctiva [Normal Venous Pressure] : normal venous pressure [No Carotid Bruit] : no carotid bruit [Normal S1, S2] : normal S1, S2 [No Murmur] : no murmur [No Gallop] : no gallop [Clear Lung Fields] : clear lung fields [Good Air Entry] : good air entry [No Respiratory Distress] : no respiratory distress  [Soft] : abdomen soft [Non Tender] : non-tender [No Masses/organomegaly] : no masses/organomegaly [Normal Bowel Sounds] : normal bowel sounds [Normal Gait] : normal gait [No Edema] : no edema [No Cyanosis] : no cyanosis [No Clubbing] : no clubbing [No Varicosities] : no varicosities [No Rash] : no rash [No Skin Lesions] : no skin lesions [Moves all extremities] : moves all extremities [No Focal Deficits] : no focal deficits [Normal Speech] : normal speech [Alert and Oriented] : alert and oriented [Normal memory] : normal memory [de-identified] : + PPM

## 2021-09-20 NOTE — HISTORY OF PRESENT ILLNESS
[FreeTextEntry1] :  \par 1. Hypertension: controlled. Compliant with medications.\par  \par 2. Hyperlipidemia: on statin. The lipid profile is followed by PMD.\par 3. PPM: according to the patient, his most recent PPM check was normal.  \par \par 4. CAD/ PCI: patient underwent PCI of the LAD at [facility] in 6/2016. He had cardiac cath in 2/2017 which showed patient stents. He is on ASA and plavix (no Brillinta due to hx of epistaxis). He underwent cardiac cath that showed LAD stenosis and underwent POBA at [facility] in . \par \par 5. Carotid stenosis: bilateral carotid stenosis 50% on previous US in 2016. No new symptoms noted.\par \par He received his COVID vaccine.\par He reports intermittent CP and SOB and is scheduled for echo and NST at another facility.

## 2021-11-29 ENCOUNTER — APPOINTMENT (OUTPATIENT)
Dept: CARDIOLOGY | Facility: CLINIC | Age: 76
End: 2021-11-29
Payer: MEDICARE

## 2021-11-29 VITALS
WEIGHT: 165 LBS | RESPIRATION RATE: 17 BRPM | SYSTOLIC BLOOD PRESSURE: 122 MMHG | OXYGEN SATURATION: 98 % | HEART RATE: 83 BPM | TEMPERATURE: 98.2 F | DIASTOLIC BLOOD PRESSURE: 71 MMHG | BODY MASS INDEX: 26.63 KG/M2

## 2021-11-29 PROCEDURE — 93000 ELECTROCARDIOGRAM COMPLETE: CPT

## 2021-11-29 PROCEDURE — 99214 OFFICE O/P EST MOD 30 MIN: CPT

## 2021-11-29 NOTE — DISCUSSION/SUMMARY
[FreeTextEntry1] : 76 M HTN hyperlipidemia CAD s/p PCI PPM for f/u.\par Continue ASA 81 and plavix 75 for CAD s/p PCI.\par Continue medications for HTN and hyperlipidemia.\par Continue to monitor symptoms.\par Patient received his COVID vaccine.\par

## 2021-11-29 NOTE — HISTORY OF PRESENT ILLNESS
[FreeTextEntry1] :  \par 1. Hypertension: controlled. Controlled. \par 2. Hyperlipidemia: on statin.  \par 3. PPM: according to the patient, his most recent PPM check was normal. \par 4. CAD/ PCI: patient underwent PCI of the LAD at [facility] in 6/2016. He had cardiac cath in 2/2017 which showed patient stents. He is on ASA and plavix (no Brillinta due to hx of epistaxis). He underwent cardiac cath that showed LAD stenosis and underwent POBA at [facility] in . \par The patient denies CP or SOB. He reports that he had an echo and NST done at an outside facility about one month ago.\par 5. Carotid stenosis: bilateral carotid stenosis 50% on previous US in 2016. No new symptoms noted.\par \par He received his COVID vaccine.\par \par He denies CP or SOB.\par No cough or fevers noted.

## 2021-11-29 NOTE — PHYSICAL EXAM
[Well Developed] : well developed [Well Nourished] : well nourished [No Acute Distress] : no acute distress [Normal Conjunctiva] : normal conjunctiva [Normal Venous Pressure] : normal venous pressure [No Carotid Bruit] : no carotid bruit [Normal S1, S2] : normal S1, S2 [No Murmur] : no murmur [No Gallop] : no gallop [Clear Lung Fields] : clear lung fields [Good Air Entry] : good air entry [No Respiratory Distress] : no respiratory distress  [Soft] : abdomen soft [Non Tender] : non-tender [No Masses/organomegaly] : no masses/organomegaly [Normal Bowel Sounds] : normal bowel sounds [Normal Gait] : normal gait [No Edema] : no edema [No Cyanosis] : no cyanosis [No Clubbing] : no clubbing [No Varicosities] : no varicosities [No Rash] : no rash [No Skin Lesions] : no skin lesions [Moves all extremities] : moves all extremities [No Focal Deficits] : no focal deficits [Normal Speech] : normal speech [Alert and Oriented] : alert and oriented [Normal memory] : normal memory [de-identified] : + PPM

## 2021-11-29 NOTE — REASON FOR VISIT
[Hyperlipidemia] : hyperlipidemia [Hypertension] : hypertension [Coronary Artery Disease] : coronary artery disease [FreeTextEntry1] : 76 M HTN hyperlipidemia CAD s/p PCI PPM for f/u. \par Dr. Teddy San\par Patient reports having had echo NST at [facility] 2021.\par
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
## 2022-02-28 ENCOUNTER — APPOINTMENT (OUTPATIENT)
Dept: CARDIOLOGY | Facility: CLINIC | Age: 77
End: 2022-02-28

## 2023-06-29 NOTE — ED ADULT NURSE NOTE - RN DISCHARGE SIGNATURE
Problem: ANTEPARTUM/LABOR and DELIVERY  Goal: Maintain pregnancy as long as maternal and/or fetal condition is stable  Description: INTERVENTIONS:  - Maternal surveillance  - Fetal surveillance  - Monitor uterine activity  - Medications as ordered  - Bedrest  Outcome: Progressing  Goal: Anxiety is at manageable level  Description: INTERVENTIONS:  - [place] patient to unit/surroundings  - Establish a trusting relationship with patient  - Discuss possible complications or alterations in birth plan  - Explain treatment plan  - Explain testing/procedures prior to initiation  - Encourage participation in care  - Encourage verbalization of concerns/fears  - Identify coping mechanisms  - Administer/offer alternative therapies (Aroma therapy, distraction, guided imagery, massage, music therapy, therapeutic touch)  - Manage patient's environment (Avoid overstimulation of patient)  Outcome: Progressing  Goal: Demonstrates ability to cope with hospitalization/illness  Description: INTERVENTIONS:  - Encourage verbalization of feelings/concerns/expectations  - Provide quiet environment  - Assist patient to identify own strengths and abilities  - Encourage patient to set small goals for self  - Encourage participation in diversional activity  - Reinforce positive adaptation of new coping behaviors  - Include patient/family/caregiver in decisions  Outcome: Progressing     Problem: PAIN - ADULT  Goal: Verbalizes/displays adequate comfort level or patient's stated pain goal  Description: INTERVENTIONS:  - Encourage pt to monitor pain and request assistance  - Assess pain using appropriate pain scale  - Administer analgesics based on type and severity of pain and evaluate response  - Implement non-pharmacological measures as appropriate and evaluate response  - Consider cultural and social influences on pain and pain management  - Manage/alleviate anxiety  - Utilize distraction and/or relaxation techniques  - Monitor for opioid side effects  - Notify MD/LIP if interventions unsuccessful or patient reports new pain  - Anticipate increased pain with activity and pre-medicate as appropriate  Outcome: Progressing     Problem: ANXIETY  Goal: Will report anxiety at manageable levels  Description: INTERVENTIONS:  - Administer medication as ordered  - Teach and rehearse alternative coping skills  - Provide emotional support with 1:1 interaction with staff  Outcome: Progressing 22-Apr-2017

## 2023-09-20 ENCOUNTER — OUTPATIENT (OUTPATIENT)
Dept: OUTPATIENT SERVICES | Facility: HOSPITAL | Age: 78
LOS: 1 days | Discharge: HOME | End: 2023-09-20

## 2023-09-20 DIAGNOSIS — Z95.0 PRESENCE OF CARDIAC PACEMAKER: Chronic | ICD-10-CM

## 2023-09-27 DIAGNOSIS — C92.10 CHRONIC MYELOID LEUKEMIA, BCR/ABL-POSITIVE, NOT HAVING ACHIEVED REMISSION: ICD-10-CM

## 2024-01-16 ENCOUNTER — OUTPATIENT (OUTPATIENT)
Dept: OUTPATIENT SERVICES | Facility: HOSPITAL | Age: 79
LOS: 1 days | Discharge: HOME | End: 2024-01-16

## 2024-01-16 DIAGNOSIS — Z95.0 PRESENCE OF CARDIAC PACEMAKER: Chronic | ICD-10-CM

## 2024-01-23 DIAGNOSIS — C92.10 CHRONIC MYELOID LEUKEMIA, BCR/ABL-POSITIVE, NOT HAVING ACHIEVED REMISSION: ICD-10-CM

## 2024-05-02 ENCOUNTER — TRANSCRIPTION ENCOUNTER (OUTPATIENT)
Age: 79
End: 2024-05-02

## 2024-05-23 ENCOUNTER — APPOINTMENT (OUTPATIENT)
Age: 79
End: 2024-05-23

## 2024-06-27 NOTE — DISCHARGE NOTE PROVIDER - INSTRUCTIONS
----- Message from Oriana Mcmillan sent at 6/27/2024  8:11 AM EDT -----  Iron sat low but total iron ok , on lower side of normal. Please see if she is taking a bariatric multi with iron. If having a lot of fatigue, can try for iron infusion.   
Pt advised. See response to previous msg.   
Please continue a heart healthy diet low in sodium, cholesterol, and fat.

## 2024-08-15 ENCOUNTER — NON-APPOINTMENT (OUTPATIENT)
Age: 79
End: 2024-08-15

## 2024-08-16 ENCOUNTER — APPOINTMENT (OUTPATIENT)
Age: 79
End: 2024-08-16
Payer: MEDICARE

## 2024-08-16 ENCOUNTER — LABORATORY RESULT (OUTPATIENT)
Age: 79
End: 2024-08-16

## 2024-08-16 VITALS
SYSTOLIC BLOOD PRESSURE: 144 MMHG | HEART RATE: 78 BPM | DIASTOLIC BLOOD PRESSURE: 82 MMHG | TEMPERATURE: 97.5 F | OXYGEN SATURATION: 98 %

## 2024-08-16 DIAGNOSIS — N13.8 BENIGN PROSTATIC HYPERPLASIA WITH LOWER URINARY TRACT SYMPMS: ICD-10-CM

## 2024-08-16 DIAGNOSIS — N40.1 BENIGN PROSTATIC HYPERPLASIA WITH LOWER URINARY TRACT SYMPMS: ICD-10-CM

## 2024-08-16 DIAGNOSIS — Z78.9 OTHER SPECIFIED HEALTH STATUS: ICD-10-CM

## 2024-08-16 DIAGNOSIS — R97.20 ELEVATED PROSTATE, SPECIFIC ANTIGEN [PSA]: ICD-10-CM

## 2024-08-16 DIAGNOSIS — Z86.39 PERSONAL HISTORY OF OTHER ENDOCRINE, NUTRITIONAL AND METABOLIC DISEASE: ICD-10-CM

## 2024-08-16 PROCEDURE — G2211 COMPLEX E/M VISIT ADD ON: CPT

## 2024-08-16 PROCEDURE — 99204 OFFICE O/P NEW MOD 45 MIN: CPT

## 2024-08-16 RX ORDER — ASPIRIN 325 MG/1
TABLET, FILM COATED ORAL
Refills: 0 | Status: ACTIVE | COMMUNITY

## 2024-08-16 NOTE — PHYSICAL EXAM
[Normal Appearance] : normal appearance [Well Groomed] : well groomed [General Appearance - In No Acute Distress] : no acute distress [Edema] : no peripheral edema [Respiration, Rhythm And Depth] : normal respiratory rhythm and effort [Exaggerated Use Of Accessory Muscles For Inspiration] : no accessory muscle use [Abdomen Soft] : soft [Abdomen Tenderness] : non-tender [Costovertebral Angle Tenderness] : no ~M costovertebral angle tenderness [Urethral Meatus] : meatus normal [Urinary Bladder Findings] : the bladder was normal on palpation [Scrotum] : the scrotum was normal [Testes Tenderness] : no tenderness of the testes [Epididymis] : the epididymides were normal [Testes Mass (___cm)] : there were no testicular masses [Prostate Tenderness] : the prostate was not tender [No Prostate Nodules] : no prostate nodules [Prostate Size ___ gm] : prostate size [unfilled] gm [Normal Station and Gait] : the gait and station were normal for the patient's age [] : no rash [No Focal Deficits] : no focal deficits [Oriented To Time, Place, And Person] : oriented to person, place, and time [Affect] : the affect was normal [Mood] : the mood was normal [No Palpable Adenopathy] : no palpable adenopathy

## 2024-08-21 PROBLEM — R97.20 ELEVATED PSA: Status: ACTIVE | Noted: 2024-08-16

## 2024-08-21 PROBLEM — N40.1 BPH WITH OBSTRUCTION/LOWER URINARY TRACT SYMPTOMS: Status: ACTIVE | Noted: 2024-08-21

## 2024-08-21 PROBLEM — Z86.39 HISTORY OF TYPE 2 DIABETES MELLITUS: Status: RESOLVED | Noted: 2024-08-21 | Resolved: 2024-08-21

## 2024-08-21 LAB
APPEARANCE: CLEAR
BILIRUBIN URINE: NEGATIVE
BLOOD URINE: NEGATIVE
COLOR: YELLOW
GLUCOSE QUALITATIVE U: 500 MG/DL
KETONES URINE: NEGATIVE MG/DL
LEUKOCYTE ESTERASE URINE: NEGATIVE
NITRITE URINE: NEGATIVE
PH URINE: 5.5
PROTEIN URINE: 100 MG/DL
SPECIFIC GRAVITY URINE: >1.03
UROBILINOGEN URINE: 0.2 MG/DL

## 2024-08-21 NOTE — HISTORY OF PRESENT ILLNESS
[FreeTextEntry1] : 80 yo [place] M referred for elevated PSA Per pt, about 4 yrs ago - neg prostate biopsy Recent labwork from 8/4/24 - PSA 25 no bothersome LUTS on dual medical therapy for many years neg family history

## 2024-08-21 NOTE — ASSESSMENT
[FreeTextEntry1] : 80 yo M with elevated PSA, stable chronic BPH on dual medical therapy  - Reviewed labwork done by PCP and confirmed elevated PSA - UA - Discussed possible etiologies for elevated PSA including benign vs. malignancy - Discussed pros and cons of proceeding with a prostate needle biopsy. Discussed risk and benefits including infection, hematochezia, hematuria, hematospermia as well as specificity and sensitivity of the biopsy.  - Discussed the role of prostate MRI in the workup of elevated PSA as well - Will plan for prostate MRI to check for targetable lesions in anticipation of fusion biopsy - Will contact radiology to see if able to obtain MRI given pacemaker - Continue tamsulosin and finasteride for stable chronic BPH   Patient is being seen today for evaluation and management of a chronic and longitudinal ongoing condition and I am of the primary treating physician.

## 2024-08-21 NOTE — ASSESSMENT
[FreeTextEntry1] : 78 yo M with elevated PSA, stable chronic BPH on dual medical therapy  - Reviewed labwork done by PCP and confirmed elevated PSA - UA - Discussed possible etiologies for elevated PSA including benign vs. malignancy - Discussed pros and cons of proceeding with a prostate needle biopsy. Discussed risk and benefits including infection, hematochezia, hematuria, hematospermia as well as specificity and sensitivity of the biopsy.  - Discussed the role of prostate MRI in the workup of elevated PSA as well - Will plan for prostate MRI to check for targetable lesions in anticipation of fusion biopsy - Will contact radiology to see if able to obtain MRI given pacemaker - Continue tamsulosin and finasteride for stable chronic BPH   Patient is being seen today for evaluation and management of a chronic and longitudinal ongoing condition and I am of the primary treating physician.

## 2024-08-21 NOTE — HISTORY OF PRESENT ILLNESS
[FreeTextEntry1] : 80 yo Sam[place] M referred for elevated PSA Per pt, about 4 yrs ago - neg prostate biopsy Recent labwork from 8/4/24 - PSA 25 no bothersome LUTS on dual medical therapy for many years neg family history

## 2024-08-29 ENCOUNTER — OUTPATIENT (OUTPATIENT)
Dept: OUTPATIENT SERVICES | Facility: HOSPITAL | Age: 79
LOS: 1 days | End: 2024-08-29
Payer: MEDICARE

## 2024-08-29 ENCOUNTER — APPOINTMENT (OUTPATIENT)
Dept: MRI IMAGING | Facility: HOSPITAL | Age: 79
End: 2024-08-29

## 2024-08-29 DIAGNOSIS — Z95.0 PRESENCE OF CARDIAC PACEMAKER: Chronic | ICD-10-CM

## 2024-08-29 DIAGNOSIS — R97.20 ELEVATED PROSTATE SPECIFIC ANTIGEN [PSA]: ICD-10-CM

## 2024-08-29 DIAGNOSIS — Z95.0 PRESENCE OF CARDIAC PACEMAKER: ICD-10-CM

## 2024-08-29 PROCEDURE — A9585: CPT

## 2024-08-29 PROCEDURE — 71046 X-RAY EXAM CHEST 2 VIEWS: CPT | Mod: 26

## 2024-08-29 PROCEDURE — 71046 X-RAY EXAM CHEST 2 VIEWS: CPT

## 2024-08-29 PROCEDURE — 72197 MRI PELVIS W/O & W/DYE: CPT

## 2024-08-29 PROCEDURE — 72197 MRI PELVIS W/O & W/DYE: CPT | Mod: 26

## 2024-08-30 ENCOUNTER — OUTPATIENT (OUTPATIENT)
Dept: OUTPATIENT SERVICES | Facility: HOSPITAL | Age: 79
LOS: 1 days | Discharge: HOME | End: 2024-08-30

## 2024-08-30 DIAGNOSIS — Z95.0 PRESENCE OF CARDIAC PACEMAKER: Chronic | ICD-10-CM

## 2024-09-04 DIAGNOSIS — C92.10 CHRONIC MYELOID LEUKEMIA, BCR/ABL-POSITIVE, NOT HAVING ACHIEVED REMISSION: ICD-10-CM

## 2024-09-04 DIAGNOSIS — R93.5 ABNORMAL FINDINGS ON DIAGNOSTIC IMAGING OF OTHER ABDOMINAL REGIONS, INCLUDING RETROPERITONEUM: ICD-10-CM

## 2024-09-06 ENCOUNTER — OUTPATIENT (OUTPATIENT)
Dept: OUTPATIENT SERVICES | Facility: HOSPITAL | Age: 79
LOS: 1 days | End: 2024-09-06
Payer: SELF-PAY

## 2024-09-06 DIAGNOSIS — Z95.0 PRESENCE OF CARDIAC PACEMAKER: Chronic | ICD-10-CM

## 2024-09-06 DIAGNOSIS — R97.20 ELEVATED PROSTATE SPECIFIC ANTIGEN [PSA]: ICD-10-CM

## 2024-09-06 PROCEDURE — C8001: CPT

## 2024-09-19 ENCOUNTER — NON-APPOINTMENT (OUTPATIENT)
Age: 79
End: 2024-09-19

## 2024-09-26 ENCOUNTER — APPOINTMENT (OUTPATIENT)
Age: 79
End: 2024-09-26
Payer: MEDICARE

## 2024-09-26 VITALS
BODY MASS INDEX: 26.52 KG/M2 | HEART RATE: 82 BPM | OXYGEN SATURATION: 99 % | SYSTOLIC BLOOD PRESSURE: 127 MMHG | WEIGHT: 165 LBS | HEIGHT: 66 IN | TEMPERATURE: 98.7 F | DIASTOLIC BLOOD PRESSURE: 73 MMHG

## 2024-09-26 DIAGNOSIS — R97.20 ELEVATED PROSTATE, SPECIFIC ANTIGEN [PSA]: ICD-10-CM

## 2024-09-26 PROCEDURE — 76999F: CUSTOM

## 2024-09-26 PROCEDURE — 55700: CPT

## 2024-10-01 LAB — PROSTATE BIOPSY: NORMAL

## 2024-10-16 ENCOUNTER — APPOINTMENT (OUTPATIENT)
Age: 79
End: 2024-10-16
Payer: MEDICARE

## 2024-10-16 VITALS
SYSTOLIC BLOOD PRESSURE: 139 MMHG | HEART RATE: 81 BPM | DIASTOLIC BLOOD PRESSURE: 74 MMHG | OXYGEN SATURATION: 91 % | RESPIRATION RATE: 17 BRPM | TEMPERATURE: 97.4 F

## 2024-10-16 DIAGNOSIS — C61 MALIGNANT NEOPLASM OF PROSTATE: ICD-10-CM

## 2024-10-16 PROCEDURE — 99214 OFFICE O/P EST MOD 30 MIN: CPT

## 2024-10-24 PROBLEM — C61 ADENOCARCINOMA OF PROSTATE: Status: ACTIVE | Noted: 2024-10-16

## 2024-12-17 ENCOUNTER — OUTPATIENT (OUTPATIENT)
Dept: OUTPATIENT SERVICES | Facility: HOSPITAL | Age: 79
LOS: 1 days | End: 2024-12-17
Payer: MEDICARE

## 2024-12-17 ENCOUNTER — APPOINTMENT (OUTPATIENT)
Dept: NUCLEAR MEDICINE | Facility: IMAGING CENTER | Age: 79
End: 2024-12-17
Payer: MEDICARE

## 2024-12-17 DIAGNOSIS — Z95.0 PRESENCE OF CARDIAC PACEMAKER: Chronic | ICD-10-CM

## 2024-12-17 DIAGNOSIS — C61 MALIGNANT NEOPLASM OF PROSTATE: ICD-10-CM

## 2024-12-17 PROCEDURE — 78816 PET IMAGE W/CT FULL BODY: CPT

## 2024-12-17 PROCEDURE — A9595: CPT

## 2024-12-17 PROCEDURE — 78816 PET IMAGE W/CT FULL BODY: CPT | Mod: 26,PI

## 2025-01-03 ENCOUNTER — OUTPATIENT (OUTPATIENT)
Dept: OUTPATIENT SERVICES | Facility: HOSPITAL | Age: 80
LOS: 1 days | Discharge: HOME | End: 2025-01-03

## 2025-01-03 DIAGNOSIS — Z95.0 PRESENCE OF CARDIAC PACEMAKER: Chronic | ICD-10-CM

## 2025-01-03 RX ORDER — BICALUTAMIDE 50 MG/1
50 TABLET ORAL DAILY
Qty: 90 | Refills: 3 | Status: ACTIVE | COMMUNITY
Start: 2025-01-03 | End: 1900-01-01

## 2025-01-28 ENCOUNTER — APPOINTMENT (OUTPATIENT)
Dept: RADIATION ONCOLOGY | Facility: CLINIC | Age: 80
End: 2025-01-28
Payer: MEDICARE

## 2025-01-28 ENCOUNTER — NON-APPOINTMENT (OUTPATIENT)
Age: 80
End: 2025-01-28

## 2025-01-28 VITALS
RESPIRATION RATE: 17 BRPM | HEART RATE: 80 BPM | DIASTOLIC BLOOD PRESSURE: 81 MMHG | HEIGHT: 66 IN | SYSTOLIC BLOOD PRESSURE: 167 MMHG | BODY MASS INDEX: 26.25 KG/M2 | OXYGEN SATURATION: 99 % | TEMPERATURE: 97.5 F | WEIGHT: 163.31 LBS

## 2025-01-28 DIAGNOSIS — Z87.39 PERSONAL HISTORY OF OTHER DISEASES OF THE MUSCULOSKELETAL SYSTEM AND CONNECTIVE TISSUE: ICD-10-CM

## 2025-01-28 DIAGNOSIS — Z80.3 FAMILY HISTORY OF MALIGNANT NEOPLASM OF BREAST: ICD-10-CM

## 2025-01-28 DIAGNOSIS — U07.1 COVID-19: ICD-10-CM

## 2025-01-28 DIAGNOSIS — Z85.6 PERSONAL HISTORY OF LEUKEMIA: ICD-10-CM

## 2025-01-28 DIAGNOSIS — Z86.79 PERSONAL HISTORY OF OTHER DISEASES OF THE CIRCULATORY SYSTEM: ICD-10-CM

## 2025-01-28 DIAGNOSIS — Z23 ENCOUNTER FOR IMMUNIZATION: ICD-10-CM

## 2025-01-28 DIAGNOSIS — C61 MALIGNANT NEOPLASM OF PROSTATE: ICD-10-CM

## 2025-01-28 DIAGNOSIS — Z87.448 PERSONAL HISTORY OF OTHER DISEASES OF URINARY SYSTEM: ICD-10-CM

## 2025-01-28 DIAGNOSIS — Z87.09 PERSONAL HISTORY OF OTHER DISEASES OF THE RESPIRATORY SYSTEM: ICD-10-CM

## 2025-01-28 DIAGNOSIS — Z87.891 PERSONAL HISTORY OF NICOTINE DEPENDENCE: ICD-10-CM

## 2025-01-28 DIAGNOSIS — Z87.81 PERSONAL HISTORY OF (HEALED) TRAUMATIC FRACTURE: ICD-10-CM

## 2025-01-28 DIAGNOSIS — Z80.0 FAMILY HISTORY OF MALIGNANT NEOPLASM OF DIGESTIVE ORGANS: ICD-10-CM

## 2025-01-28 PROCEDURE — 99204 OFFICE O/P NEW MOD 45 MIN: CPT

## 2025-01-28 PROCEDURE — G2211 COMPLEX E/M VISIT ADD ON: CPT

## 2025-01-28 RX ORDER — EMPAGLIFLOZIN 10 MG/1
10 TABLET, FILM COATED ORAL
Refills: 0 | Status: ACTIVE | COMMUNITY

## 2025-01-28 RX ORDER — TADALAFIL 5 MG/1
5 TABLET ORAL
Refills: 0 | Status: ACTIVE | COMMUNITY

## 2025-01-28 RX ORDER — TAMSULOSIN HYDROCHLORIDE 0.4 MG/1
0.4 CAPSULE ORAL
Refills: 0 | Status: ACTIVE | COMMUNITY

## 2025-01-28 RX ORDER — ISOSORBIDE DINITRATE 30 MG/1
30 TABLET ORAL
Refills: 0 | Status: ACTIVE | COMMUNITY

## 2025-01-28 RX ORDER — DOCUSATE SODIUM 100 MG/1
100 CAPSULE, LIQUID FILLED ORAL
Refills: 0 | Status: ACTIVE | COMMUNITY

## 2025-01-28 RX ORDER — OLMESARTAN MEDOXOMIL 40 MG/1
TABLET, FILM COATED ORAL
Refills: 0 | Status: ACTIVE | COMMUNITY

## 2025-01-28 RX ORDER — SENNOSIDES 8.6 MG TABLETS 8.6 MG/1
8.6 TABLET ORAL
Refills: 0 | Status: ACTIVE | COMMUNITY

## 2025-02-07 ENCOUNTER — NON-APPOINTMENT (OUTPATIENT)
Age: 80
End: 2025-02-07

## 2025-02-14 ENCOUNTER — APPOINTMENT (OUTPATIENT)
Age: 80
End: 2025-02-14

## 2025-02-21 ENCOUNTER — APPOINTMENT (OUTPATIENT)
Age: 80
End: 2025-02-21

## 2025-02-21 VITALS
SYSTOLIC BLOOD PRESSURE: 165 MMHG | OXYGEN SATURATION: 98 % | HEART RATE: 80 BPM | DIASTOLIC BLOOD PRESSURE: 90 MMHG | RESPIRATION RATE: 17 BRPM | TEMPERATURE: 98.1 F

## 2025-02-21 DIAGNOSIS — C61 MALIGNANT NEOPLASM OF PROSTATE: ICD-10-CM

## 2025-02-21 PROCEDURE — 96402 CHEMO HORMON ANTINEOPL SQ/IM: CPT

## 2025-02-21 RX ORDER — LEUPROLIDE ACETATE 30 MG/.5ML
30 INJECTION, SUSPENSION, EXTENDED RELEASE SUBCUTANEOUS
Refills: 0 | Status: COMPLETED | OUTPATIENT
Start: 2025-02-21

## 2025-02-26 ENCOUNTER — NON-APPOINTMENT (OUTPATIENT)
Age: 80
End: 2025-02-26

## 2025-02-28 ENCOUNTER — OUTPATIENT (OUTPATIENT)
Dept: OUTPATIENT SERVICES | Facility: HOSPITAL | Age: 80
LOS: 1 days | Discharge: HOME | End: 2025-02-28

## 2025-02-28 DIAGNOSIS — Z95.0 PRESENCE OF CARDIAC PACEMAKER: Chronic | ICD-10-CM

## 2025-03-03 DIAGNOSIS — C92.10 CHRONIC MYELOID LEUKEMIA, BCR/ABL-POSITIVE, NOT HAVING ACHIEVED REMISSION: ICD-10-CM

## 2025-03-03 RX ORDER — SODIUM PHOSPHATE, DIBASIC AND SODIUM PHOSPHATE, MONOBASIC 7; 19 G/230ML; G/230ML
ENEMA RECTAL
Qty: 1 | Refills: 0 | Status: DISCONTINUED | COMMUNITY
Start: 2025-02-26 | End: 2025-03-03

## 2025-03-03 RX ORDER — AMOXICILLIN AND CLAVULANATE POTASSIUM 875; 125 MG/1; MG/1
875-125 TABLET, COATED ORAL
Qty: 6 | Refills: 0 | Status: ACTIVE | COMMUNITY
Start: 2025-03-03 | End: 1900-01-01

## 2025-03-03 RX ORDER — SODIUM PHOSPHATE, DIBASIC AND SODIUM PHOSPHATE, MONOBASIC 7; 19 G/230ML; G/230ML
ENEMA RECTAL
Qty: 1 | Refills: 0 | Status: ACTIVE | COMMUNITY
Start: 2025-03-03 | End: 1900-01-01

## 2025-03-03 RX ORDER — AMOXICILLIN AND CLAVULANATE POTASSIUM 875; 125 MG/1; MG/1
875-125 TABLET, COATED ORAL
Qty: 6 | Refills: 0 | Status: DISCONTINUED | COMMUNITY
Start: 2025-02-26 | End: 2025-03-03

## 2025-03-05 ENCOUNTER — APPOINTMENT (OUTPATIENT)
Dept: HEMATOLOGY ONCOLOGY | Facility: CLINIC | Age: 80
End: 2025-03-05
Payer: MEDICARE

## 2025-03-05 VITALS
RESPIRATION RATE: 16 BRPM | SYSTOLIC BLOOD PRESSURE: 148 MMHG | TEMPERATURE: 97.2 F | BODY MASS INDEX: 26.87 KG/M2 | HEART RATE: 68 BPM | DIASTOLIC BLOOD PRESSURE: 81 MMHG | OXYGEN SATURATION: 98 % | WEIGHT: 166.45 LBS

## 2025-03-05 DIAGNOSIS — C61 MALIGNANT NEOPLASM OF PROSTATE: ICD-10-CM

## 2025-03-05 PROCEDURE — 99205 OFFICE O/P NEW HI 60 MIN: CPT

## 2025-03-18 DIAGNOSIS — R11.0 NAUSEA: ICD-10-CM

## 2025-03-21 ENCOUNTER — NON-APPOINTMENT (OUTPATIENT)
Age: 80
End: 2025-03-21

## 2025-03-25 ENCOUNTER — NON-APPOINTMENT (OUTPATIENT)
Age: 80
End: 2025-03-25

## 2025-03-25 RX ORDER — ONDANSETRON 4 MG/1
4 TABLET, ORALLY DISINTEGRATING ORAL
Qty: 0 | Refills: 0 | Status: COMPLETED | OUTPATIENT
Start: 2025-03-18

## 2025-03-26 ENCOUNTER — NON-APPOINTMENT (OUTPATIENT)
Age: 80
End: 2025-03-26

## 2025-04-01 ENCOUNTER — NON-APPOINTMENT (OUTPATIENT)
Age: 80
End: 2025-04-01

## 2025-04-02 NOTE — ED PROVIDER NOTE - NS ED MD DISPO DISCHARGE CCDA
POST-OPERATIVE INSTRUCTION SHEET: Right Shoulder Arthroscopy      MEDICATIONS: You will be given a prescription for pain medication prior to discharge. This medication may be taken as directed for breakthrough pain. You should try taking Extra Strength Tylenol first (500 mg every 4 hours) which is available over-the-counter. Be sure not to exceed 3,000 mg in 24 hours. We ask that you avoid NSAIDs (Advil, Motrin, Aleve, ibuprofen, etc.) for the first few weeks after your surgery as these may interrupt tendon healing. You will also be given a prescription for antinausea medication, Zofran.    You should take a stool softener while you are taking narcotics. The pain medication can cause significant constipation. Miralax or Senna can be purchased over the counter and is taken twice daily.     ICE: An ice device or an ice bag (not directly touching the skin) should be utilized to reduce swelling and pain. Please ice every 3-4 hours for about 15-20 minutes each time for at least the first 5 days or until swelling subsides. We have Polar Ice devices in our office available for purchase and our staff would be happy to assist you with this. Although this is helpful, it is not mandatory and available to you only if you would like one.      SLING: Non-weight Bearing Right Upper Extremity with Sling. You will be given a sling, in some cases with an abduction pillow. This should be worn for at least 4-6 weeks unless directed by Dr. Thomas or his PA. The sling may be removed for hygiene and for exercises. You should sleep with the sling on.     EXERCISES: Pendulum exercises to be performed for 3-5 minutes every 1-2 hours. When lying in bed, elevate the head of your bed. Move your fingers, hand and elbow to increase circulation. DO NOT lift your arm at the shoulder as to avoid destroying the repair, until it has healed (when Cleared by Dr. Thomas)    PHYSICAL THERAPY: You will begin PT usually 2 weeks after surgery and a PT prescription and protocol will be given to you at your first post-operative appointment. Please plan to begin PT within a week of this appointment. You will schedule this on your own but we can assist you in finding a convenient facility.      WOUND CARE:  Leave your surgical dressing on for 3 days. After 3 days you may remove your dressing and shower. Dry the incisions well and apply a small dressing or Band-Aid over the incisions. Keeping these areas dry and clean is very important.     FOLLOW UP: If you do not already have a follow-up visit scheduled, please call [phone] to schedule one with Dr. Thomas or his PA within 7-10 days for suture removal and to receive the PT prescription and protocol. POST-OPERATIVE INSTRUCTION SHEET: Right Shoulder Arthroscopy      MEDICATIONS: You will be given a prescription for pain medication prior to discharge. This medication may be taken as directed for breakthrough pain. You should try taking Extra Strength Tylenol first (500 mg every 4 hours) which is available over-the-counter. Be sure not to exceed 3,000 mg in 24 hours. We ask that you avoid NSAIDs (Advil, Motrin, Aleve, ibuprofen, etc.) for the first few weeks after your surgery as these may interrupt tendon healing. You will also be given a prescription for antinausea medication, Zofran.    You should take a stool softener while you are taking narcotics. The pain medication can cause significant constipation. Miralax or Senna can be purchased over the counter and is taken twice daily.     ICE: An ice device or an ice bag (not directly touching the skin) should be utilized to reduce swelling and pain. Please ice every 3-4 hours for about 15-20 minutes each time for at least the first 5 days or until swelling subsides. We have Polar Ice devices in our office available for purchase and our staff would be happy to assist you with this. Although this is helpful, it is not mandatory and available to you only if you would like one.      SLING: Non-weight Bearing Right Upper Extremity with Sling. You will be given a sling, in some cases with an abduction pillow. This should be worn for at least 4-6 weeks unless directed by Dr. Thomas or his PA. The sling may be removed for hygiene and for exercises. You should sleep with the sling on.     EXERCISES: Pendulum exercises to be performed for 3-5 minutes every 1-2 hours. When lying in bed, elevate the head of your bed. Move your fingers, hand and elbow to increase circulation. DO NOT lift your arm at the shoulder as to avoid destroying the repair, until it has healed (when Cleared by Dr. Thomas)    PHYSICAL THERAPY: You will begin PT usually 2 weeks after surgery and a PT prescription and protocol will be given to you at your first post-operative appointment. Please plan to begin PT within a week of this appointment. You will schedule this on your own but we can assist you in finding a convenient facility.      WOUND CARE:  Leave your surgical dressing on for 3 days. After 3 days you may remove your dressing and shower. Dry the incisions well and apply a small dressing or Band-Aid over the incisions. Keeping these areas dry and clean is very important.     FOLLOW UP: If you do not already have a follow-up visit scheduled, please call [phone] to schedule one with Dr. Thomas or his PA within 7-10 days for suture removal and to receive the PT prescription and protocol.    Please refer to Dr. Sandoval's instruction sheet regarding post-operative care after surgery. Patient/Caregiver provided printed discharge information.

## 2025-04-28 ENCOUNTER — NON-APPOINTMENT (OUTPATIENT)
Age: 80
End: 2025-04-28

## 2025-04-28 DIAGNOSIS — Z92.3 PERSONAL HISTORY OF IRRADIATION: ICD-10-CM

## 2025-05-12 ENCOUNTER — NON-APPOINTMENT (OUTPATIENT)
Age: 80
End: 2025-05-12

## 2025-05-13 ENCOUNTER — NON-APPOINTMENT (OUTPATIENT)
Age: 80
End: 2025-05-13

## 2025-06-03 ENCOUNTER — APPOINTMENT (OUTPATIENT)
Dept: RADIATION ONCOLOGY | Facility: CLINIC | Age: 80
End: 2025-06-03
Payer: MEDICARE

## 2025-06-03 VITALS
DIASTOLIC BLOOD PRESSURE: 85 MMHG | SYSTOLIC BLOOD PRESSURE: 156 MMHG | WEIGHT: 161.38 LBS | TEMPERATURE: 98.1 F | HEART RATE: 78 BPM | BODY MASS INDEX: 26.05 KG/M2 | OXYGEN SATURATION: 99 % | RESPIRATION RATE: 16 BRPM

## 2025-06-03 PROCEDURE — 99214 OFFICE O/P EST MOD 30 MIN: CPT

## 2025-06-20 ENCOUNTER — APPOINTMENT (OUTPATIENT)
Age: 80
End: 2025-06-20

## 2025-06-20 VITALS
HEART RATE: 80 BPM | OXYGEN SATURATION: 97 % | TEMPERATURE: 97.2 F | DIASTOLIC BLOOD PRESSURE: 92 MMHG | SYSTOLIC BLOOD PRESSURE: 169 MMHG

## 2025-06-20 PROCEDURE — 96402 CHEMO HORMON ANTINEOPL SQ/IM: CPT

## 2025-06-20 RX ORDER — LEUPROLIDE ACETATE 45 MG/.375ML
45 INJECTION, SUSPENSION, EXTENDED RELEASE SUBCUTANEOUS
Refills: 0 | Status: COMPLETED | OUTPATIENT
Start: 2025-06-20

## 2025-06-24 ENCOUNTER — OUTPATIENT (OUTPATIENT)
Dept: OUTPATIENT SERVICES | Facility: HOSPITAL | Age: 80
LOS: 1 days | Discharge: HOME | End: 2025-06-24

## 2025-06-24 DIAGNOSIS — C92.10 CHRONIC MYELOID LEUKEMIA, BCR/ABL-POSITIVE, NOT HAVING ACHIEVED REMISSION: ICD-10-CM

## 2025-06-24 DIAGNOSIS — Z95.0 PRESENCE OF CARDIAC PACEMAKER: Chronic | ICD-10-CM

## 2025-06-25 ENCOUNTER — RESULT REVIEW (OUTPATIENT)
Age: 80
End: 2025-06-25

## 2025-06-25 ENCOUNTER — APPOINTMENT (OUTPATIENT)
Dept: HEMATOLOGY ONCOLOGY | Facility: CLINIC | Age: 80
End: 2025-06-25
Payer: MEDICARE

## 2025-06-25 VITALS
BODY MASS INDEX: 25.82 KG/M2 | OXYGEN SATURATION: 98 % | RESPIRATION RATE: 16 BRPM | SYSTOLIC BLOOD PRESSURE: 147 MMHG | DIASTOLIC BLOOD PRESSURE: 79 MMHG | HEART RATE: 88 BPM | TEMPERATURE: 97 F | WEIGHT: 159.99 LBS

## 2025-06-25 LAB
BASOPHILS # BLD AUTO: 0.14 K/UL — SIGNIFICANT CHANGE UP (ref 0–0.2)
BASOPHILS NFR BLD AUTO: 1.5 % — SIGNIFICANT CHANGE UP (ref 0–2)
EOSINOPHIL # BLD AUTO: 0.22 K/UL — SIGNIFICANT CHANGE UP (ref 0–0.5)
EOSINOPHIL NFR BLD AUTO: 2.4 % — SIGNIFICANT CHANGE UP (ref 0–6)
HCT VFR BLD CALC: 45.5 % — SIGNIFICANT CHANGE UP (ref 39–50)
HGB BLD-MCNC: 15.1 G/DL — SIGNIFICANT CHANGE UP (ref 13–17)
IMM GRANULOCYTES NFR BLD AUTO: 3.6 % — HIGH (ref 0–0.9)
LYMPHOCYTES # BLD AUTO: 0.81 K/UL — LOW (ref 1–3.3)
LYMPHOCYTES # BLD AUTO: 8.8 % — LOW (ref 13–44)
MCHC RBC-ENTMCNC: 29.2 PG — SIGNIFICANT CHANGE UP (ref 27–34)
MCHC RBC-ENTMCNC: 33.2 G/DL — SIGNIFICANT CHANGE UP (ref 32–36)
MCV RBC AUTO: 87.8 FL — SIGNIFICANT CHANGE UP (ref 80–100)
MONOCYTES # BLD AUTO: 0.55 K/UL — SIGNIFICANT CHANGE UP (ref 0–0.9)
MONOCYTES NFR BLD AUTO: 6 % — SIGNIFICANT CHANGE UP (ref 2–14)
NEUTROPHILS # BLD AUTO: 7.18 K/UL — SIGNIFICANT CHANGE UP (ref 1.8–7.4)
NEUTROPHILS NFR BLD AUTO: 77.7 % — HIGH (ref 43–77)
NRBC BLD AUTO-RTO: 0 /100 WBCS — SIGNIFICANT CHANGE UP (ref 0–0)
PLATELET # BLD AUTO: 260 K/UL — SIGNIFICANT CHANGE UP (ref 150–400)
RBC # BLD: 5.18 M/UL — SIGNIFICANT CHANGE UP (ref 4.2–5.8)
RBC # FLD: 14.2 % — SIGNIFICANT CHANGE UP (ref 10.3–14.5)
WBC # BLD: 9.23 K/UL — SIGNIFICANT CHANGE UP (ref 3.8–10.5)
WBC # FLD AUTO: 9.23 K/UL — SIGNIFICANT CHANGE UP (ref 3.8–10.5)

## 2025-06-25 PROCEDURE — 99214 OFFICE O/P EST MOD 30 MIN: CPT

## 2025-06-25 RX ORDER — PREDNISONE 5 MG/1
5 TABLET ORAL DAILY
Qty: 30 | Refills: 5 | Status: ACTIVE | COMMUNITY
Start: 2025-06-25 | End: 1900-01-01

## 2025-06-25 RX ORDER — ABIRATERONE ACETATE 500 MG/1
500 TABLET, FILM COATED ORAL
Qty: 60 | Refills: 5 | Status: ACTIVE | COMMUNITY
Start: 2025-06-25 | End: 1900-01-01

## 2025-06-27 PROBLEM — E87.6 HYPOKALEMIA: Status: ACTIVE | Noted: 2025-06-27

## 2025-06-27 LAB
ALBUMIN SERPL ELPH-MCNC: 4.4 G/DL
ALP BLD-CCNC: 77 U/L
ALT SERPL-CCNC: 35 U/L
ANION GAP SERPL CALC-SCNC: 21 MMOL/L
AST SERPL-CCNC: 28 U/L
BILIRUB SERPL-MCNC: 0.3 MG/DL
BUN SERPL-MCNC: 22 MG/DL
CALCIUM SERPL-MCNC: 10 MG/DL
CHLORIDE SERPL-SCNC: 99 MMOL/L
CO2 SERPL-SCNC: 24 MMOL/L
CREAT SERPL-MCNC: 1.2 MG/DL
EGFRCR SERPLBLD CKD-EPI 2021: 62 ML/MIN/1.73M2
GLUCOSE SERPL-MCNC: 195 MG/DL
POTASSIUM SERPL-SCNC: 3.2 MMOL/L
PROT SERPL-MCNC: 7.3 G/DL
PSA SERPL-MCNC: 0.32 NG/ML
SODIUM SERPL-SCNC: 143 MMOL/L

## 2025-07-01 ENCOUNTER — APPOINTMENT (OUTPATIENT)
Dept: HEMATOLOGY ONCOLOGY | Facility: CLINIC | Age: 80
End: 2025-07-01

## 2025-07-01 ENCOUNTER — APPOINTMENT (OUTPATIENT)
Dept: RADIOLOGY | Facility: IMAGING CENTER | Age: 80
End: 2025-07-01

## 2025-07-18 ENCOUNTER — RESULT REVIEW (OUTPATIENT)
Age: 80
End: 2025-07-18

## 2025-07-18 ENCOUNTER — NON-APPOINTMENT (OUTPATIENT)
Age: 80
End: 2025-07-18

## 2025-07-18 ENCOUNTER — APPOINTMENT (OUTPATIENT)
Dept: HEMATOLOGY ONCOLOGY | Facility: CLINIC | Age: 80
End: 2025-07-18
Payer: MEDICARE

## 2025-07-18 VITALS
HEART RATE: 86 BPM | DIASTOLIC BLOOD PRESSURE: 77 MMHG | RESPIRATION RATE: 16 BRPM | TEMPERATURE: 97.1 F | SYSTOLIC BLOOD PRESSURE: 164 MMHG | BODY MASS INDEX: 25.73 KG/M2 | WEIGHT: 159.39 LBS | OXYGEN SATURATION: 98 %

## 2025-07-18 LAB
BASOPHILS # BLD AUTO: 0.22 K/UL — HIGH (ref 0–0.2)
BASOPHILS NFR BLD AUTO: 2 % — SIGNIFICANT CHANGE UP (ref 0–2)
EOSINOPHIL # BLD AUTO: 0.16 K/UL — SIGNIFICANT CHANGE UP (ref 0–0.5)
EOSINOPHIL NFR BLD AUTO: 1.5 % — SIGNIFICANT CHANGE UP (ref 0–6)
HCT VFR BLD CALC: 45.5 % — SIGNIFICANT CHANGE UP (ref 39–50)
HGB BLD-MCNC: 15.2 G/DL — SIGNIFICANT CHANGE UP (ref 13–17)
IMM GRANULOCYTES NFR BLD AUTO: 3.8 % — HIGH (ref 0–0.9)
LYMPHOCYTES # BLD AUTO: 1.1 K/UL — SIGNIFICANT CHANGE UP (ref 1–3.3)
LYMPHOCYTES # BLD AUTO: 10.1 % — LOW (ref 13–44)
MCHC RBC-ENTMCNC: 28.7 PG — SIGNIFICANT CHANGE UP (ref 27–34)
MCHC RBC-ENTMCNC: 33.4 G/DL — SIGNIFICANT CHANGE UP (ref 32–36)
MCV RBC AUTO: 85.8 FL — SIGNIFICANT CHANGE UP (ref 80–100)
MONOCYTES # BLD AUTO: 0.54 K/UL — SIGNIFICANT CHANGE UP (ref 0–0.9)
MONOCYTES NFR BLD AUTO: 4.9 % — SIGNIFICANT CHANGE UP (ref 2–14)
NEUTROPHILS # BLD AUTO: 8.48 K/UL — HIGH (ref 1.8–7.4)
NEUTROPHILS NFR BLD AUTO: 77.7 % — HIGH (ref 43–77)
NRBC BLD AUTO-RTO: 0 /100 WBCS — SIGNIFICANT CHANGE UP (ref 0–0)
PLATELET # BLD AUTO: 301 K/UL — SIGNIFICANT CHANGE UP (ref 150–400)
RBC # BLD: 5.3 M/UL — SIGNIFICANT CHANGE UP (ref 4.2–5.8)
RBC # FLD: 13.5 % — SIGNIFICANT CHANGE UP (ref 10.3–14.5)
WBC # BLD: 10.91 K/UL — HIGH (ref 3.8–10.5)
WBC # FLD AUTO: 10.91 K/UL — HIGH (ref 3.8–10.5)

## 2025-07-18 PROCEDURE — 99213 OFFICE O/P EST LOW 20 MIN: CPT

## 2025-07-20 LAB
ALBUMIN SERPL ELPH-MCNC: 4.6 G/DL
ALP BLD-CCNC: 73 U/L
ALT SERPL-CCNC: 32 U/L
ANION GAP SERPL CALC-SCNC: 18 MMOL/L
AST SERPL-CCNC: 31 U/L
BILIRUB SERPL-MCNC: 0.4 MG/DL
BUN SERPL-MCNC: 25 MG/DL
CALCIUM SERPL-MCNC: 10.3 MG/DL
CHLORIDE SERPL-SCNC: 96 MMOL/L
CO2 SERPL-SCNC: 26 MMOL/L
CREAT SERPL-MCNC: 1.14 MG/DL
EGFRCR SERPLBLD CKD-EPI 2021: 66 ML/MIN/1.73M2
GLUCOSE SERPL-MCNC: 330 MG/DL
POTASSIUM SERPL-SCNC: 2.9 MMOL/L
PROT SERPL-MCNC: 7.4 G/DL
PSA SERPL-MCNC: 0.1 NG/ML
SODIUM SERPL-SCNC: 140 MMOL/L

## 2025-07-22 RX ORDER — POTASSIUM CHLORIDE 1500 MG/1
20 TABLET, FILM COATED, EXTENDED RELEASE ORAL
Qty: 28 | Refills: 0 | Status: ACTIVE | COMMUNITY
Start: 2025-07-18 | End: 1900-01-01

## 2025-07-25 ENCOUNTER — RESULT REVIEW (OUTPATIENT)
Age: 80
End: 2025-07-25

## 2025-07-25 ENCOUNTER — APPOINTMENT (OUTPATIENT)
Dept: HEMATOLOGY ONCOLOGY | Facility: CLINIC | Age: 80
End: 2025-07-25

## 2025-07-25 LAB
ALBUMIN SERPL ELPH-MCNC: 4.5 G/DL — SIGNIFICANT CHANGE UP (ref 3.3–5)
ALP SERPL-CCNC: 67 U/L — SIGNIFICANT CHANGE UP (ref 40–120)
ALT FLD-CCNC: 53 U/L — HIGH (ref 10–45)
ANION GAP SERPL CALC-SCNC: 13 MMOL/L — SIGNIFICANT CHANGE UP (ref 5–17)
AST SERPL-CCNC: 37 U/L — SIGNIFICANT CHANGE UP (ref 10–40)
BILIRUB SERPL-MCNC: 0.4 MG/DL — SIGNIFICANT CHANGE UP (ref 0.2–1.2)
BUN SERPL-MCNC: 19 MG/DL — SIGNIFICANT CHANGE UP (ref 7–23)
CALCIUM SERPL-MCNC: 9.4 MG/DL — SIGNIFICANT CHANGE UP (ref 8.4–10.5)
CHLORIDE SERPL-SCNC: 103 MMOL/L — SIGNIFICANT CHANGE UP (ref 96–108)
CO2 SERPL-SCNC: 22 MMOL/L — SIGNIFICANT CHANGE UP (ref 22–31)
CREAT SERPL-MCNC: 0.97 MG/DL — SIGNIFICANT CHANGE UP (ref 0.5–1.3)
EGFR: 80 ML/MIN/1.73M2 — SIGNIFICANT CHANGE UP
EGFR: 80 ML/MIN/1.73M2 — SIGNIFICANT CHANGE UP
GLUCOSE SERPL-MCNC: 256 MG/DL — HIGH (ref 70–99)
MAGNESIUM SERPL-MCNC: 2.4 MG/DL — SIGNIFICANT CHANGE UP (ref 1.6–2.6)
POTASSIUM SERPL-MCNC: 4.5 MMOL/L — SIGNIFICANT CHANGE UP (ref 3.5–5.3)
POTASSIUM SERPL-SCNC: 4.5 MMOL/L — SIGNIFICANT CHANGE UP (ref 3.5–5.3)
PROT SERPL-MCNC: 7 G/DL — SIGNIFICANT CHANGE UP (ref 6–8.3)
SODIUM SERPL-SCNC: 139 MMOL/L — SIGNIFICANT CHANGE UP (ref 135–145)

## 2025-08-15 ENCOUNTER — APPOINTMENT (OUTPATIENT)
Dept: HEMATOLOGY ONCOLOGY | Facility: CLINIC | Age: 80
End: 2025-08-15

## 2025-09-05 ENCOUNTER — APPOINTMENT (OUTPATIENT)
Dept: HEMATOLOGY ONCOLOGY | Facility: CLINIC | Age: 80
End: 2025-09-05

## 2025-09-09 ENCOUNTER — TRANSCRIPTION ENCOUNTER (OUTPATIENT)
Age: 80
End: 2025-09-09